# Patient Record
Sex: FEMALE | Race: WHITE | Employment: OTHER | URBAN - METROPOLITAN AREA
[De-identification: names, ages, dates, MRNs, and addresses within clinical notes are randomized per-mention and may not be internally consistent; named-entity substitution may affect disease eponyms.]

---

## 2012-03-01 LAB — COLONOSCOPY, EXTERNAL: NORMAL

## 2017-04-11 RX ORDER — SODIUM CHLORIDE 0.9 % (FLUSH) 0.9 %
5-10 SYRINGE (ML) INJECTION EVERY 8 HOURS
Status: CANCELLED | OUTPATIENT
Start: 2017-04-11 | End: 2017-04-12

## 2017-04-11 RX ORDER — FENTANYL CITRATE 50 UG/ML
100 INJECTION, SOLUTION INTRAMUSCULAR; INTRAVENOUS
Status: CANCELLED | OUTPATIENT
Start: 2017-04-11

## 2017-04-11 RX ORDER — SODIUM CHLORIDE 9 MG/ML
50 INJECTION, SOLUTION INTRAVENOUS CONTINUOUS
Status: CANCELLED | OUTPATIENT
Start: 2017-04-11 | End: 2017-04-12

## 2017-04-11 RX ORDER — ATROPINE SULFATE 0.1 MG/ML
0.5 INJECTION INTRAVENOUS
Status: CANCELLED | OUTPATIENT
Start: 2017-04-11 | End: 2017-04-12

## 2017-04-11 RX ORDER — SODIUM CHLORIDE 0.9 % (FLUSH) 0.9 %
5-10 SYRINGE (ML) INJECTION AS NEEDED
Status: CANCELLED | OUTPATIENT
Start: 2017-04-11 | End: 2017-04-12

## 2017-04-11 RX ORDER — DEXTROMETHORPHAN/PSEUDOEPHED 2.5-7.5/.8
1.2 DROPS ORAL
Status: CANCELLED | OUTPATIENT
Start: 2017-04-11

## 2017-04-11 RX ORDER — MIDAZOLAM HYDROCHLORIDE 1 MG/ML
.25-1 INJECTION, SOLUTION INTRAMUSCULAR; INTRAVENOUS
Status: CANCELLED | OUTPATIENT
Start: 2017-04-11 | End: 2017-04-12

## 2017-04-11 RX ORDER — EPINEPHRINE 0.1 MG/ML
1 INJECTION INTRACARDIAC; INTRAVENOUS
Status: CANCELLED | OUTPATIENT
Start: 2017-04-11 | End: 2017-04-12

## 2017-04-11 RX ORDER — FLUMAZENIL 0.1 MG/ML
0.2 INJECTION INTRAVENOUS
Status: CANCELLED | OUTPATIENT
Start: 2017-04-11 | End: 2017-04-12

## 2017-04-11 RX ORDER — NALOXONE HYDROCHLORIDE 0.4 MG/ML
0.4 INJECTION, SOLUTION INTRAMUSCULAR; INTRAVENOUS; SUBCUTANEOUS
Status: CANCELLED | OUTPATIENT
Start: 2017-04-11 | End: 2017-04-12

## 2017-04-12 ENCOUNTER — ANESTHESIA (OUTPATIENT)
Dept: ENDOSCOPY | Age: 82
End: 2017-04-12
Payer: MEDICARE

## 2017-04-12 ENCOUNTER — ANESTHESIA EVENT (OUTPATIENT)
Dept: ENDOSCOPY | Age: 82
End: 2017-04-12
Payer: MEDICARE

## 2017-04-12 ENCOUNTER — HOSPITAL ENCOUNTER (OUTPATIENT)
Age: 82
Setting detail: OUTPATIENT SURGERY
Discharge: HOME | End: 2017-04-12
Attending: INTERNAL MEDICINE | Admitting: INTERNAL MEDICINE
Payer: MEDICARE

## 2017-04-12 VITALS
TEMPERATURE: 98 F | OXYGEN SATURATION: 96 % | WEIGHT: 160 LBS | BODY MASS INDEX: 27.31 KG/M2 | DIASTOLIC BLOOD PRESSURE: 72 MMHG | HEART RATE: 74 BPM | RESPIRATION RATE: 14 BRPM | SYSTOLIC BLOOD PRESSURE: 120 MMHG | HEIGHT: 64 IN

## 2017-04-12 LAB
GLUCOSE BLD STRIP.AUTO-MCNC: 85 MG/DL (ref 65–100)
SERVICE CMNT-IMP: NORMAL

## 2017-04-12 PROCEDURE — 76060000031 HC ANESTHESIA FIRST 0.5 HR: Performed by: INTERNAL MEDICINE

## 2017-04-12 PROCEDURE — 82962 GLUCOSE BLOOD TEST: CPT

## 2017-04-12 PROCEDURE — 76040000019: Performed by: INTERNAL MEDICINE

## 2017-04-12 PROCEDURE — 74011250636 HC RX REV CODE- 250/636

## 2017-04-12 PROCEDURE — 74011000250 HC RX REV CODE- 250

## 2017-04-12 RX ORDER — LIDOCAINE HYDROCHLORIDE 20 MG/ML
INJECTION, SOLUTION EPIDURAL; INFILTRATION; INTRACAUDAL; PERINEURAL AS NEEDED
Status: DISCONTINUED | OUTPATIENT
Start: 2017-04-12 | End: 2017-04-12 | Stop reason: HOSPADM

## 2017-04-12 RX ORDER — PROPOFOL 10 MG/ML
INJECTION, EMULSION INTRAVENOUS AS NEEDED
Status: DISCONTINUED | OUTPATIENT
Start: 2017-04-12 | End: 2017-04-12 | Stop reason: HOSPADM

## 2017-04-12 RX ORDER — SODIUM CHLORIDE 9 MG/ML
INJECTION, SOLUTION INTRAVENOUS
Status: DISCONTINUED | OUTPATIENT
Start: 2017-04-12 | End: 2017-04-12 | Stop reason: HOSPADM

## 2017-04-12 RX ADMIN — PROPOFOL 50 MG: 10 INJECTION, EMULSION INTRAVENOUS at 15:28

## 2017-04-12 RX ADMIN — PROPOFOL 50 MG: 10 INJECTION, EMULSION INTRAVENOUS at 15:30

## 2017-04-12 RX ADMIN — SODIUM CHLORIDE: 9 INJECTION, SOLUTION INTRAVENOUS at 15:16

## 2017-04-12 RX ADMIN — PROPOFOL 100 MG: 10 INJECTION, EMULSION INTRAVENOUS at 15:26

## 2017-04-12 RX ADMIN — LIDOCAINE HYDROCHLORIDE 100 MG: 20 INJECTION, SOLUTION EPIDURAL; INFILTRATION; INTRACAUDAL; PERINEURAL at 15:26

## 2017-04-12 NOTE — IP AVS SNAPSHOT
[address] 
[phone] Patient: Shruti Chavez MRN: [id number] MCP:3/32/6491 You are allergic to the following Allergen Reactions Ketotifen Other (comments) Redness of eye, irritation. Lisinopril Cough Wheat Other (comments) Recent Documentation Height Weight Breastfeeding? BMI OB Status Smoking Status 1.626 m 72.6 kg No 27.46 kg/m2 Postmenopausal Never Smoker Emergency Contacts Name Discharge Info Relation Home Work Mobile [address] CAREGIVER [3] Spouse [3] [phone] About your hospitalization You were admitted on:  April 12, 2017 You last received care in the:  [facility] ENDOSCOPY You were discharged on:  April 12, 2017 Unit phone number:  [phone] Why you were hospitalized Your primary diagnosis was:  Not on File Providers Seen During Your Hospitalizations Provider Role Specialty Primary office phone Tati Loya MD Attending Provider Gastroenterology [phone] Your Primary Care Physician (PCP) Primary Care Physician Office Phone Office Fax TRISTAN SEGUNDO ** None ** ** None ** Follow-up Information None Current Discharge Medication List  
  
CONTINUE these medications which have NOT CHANGED Dose & Instructions Dispensing Information Comments Morning Noon Evening Bedtime  
 amLODIPine 5 mg tablet Commonly known as:  Benjimajustin Floro Your last dose was: Your next dose is:    
   
   
 Dose:  5 mg Take 5 mg by mouth daily. Refills:  0  
     
   
   
   
  
 aspirin 81 mg tablet Your last dose was: Your next dose is:    
   
   
 Dose:  81 mg Take 81 mg by mouth daily. Refills:  0  
     
   
   
   
  
 atorvastatin 40 mg tablet Commonly known as:  LIPITOR Your last dose was: Your next dose is:    
   
   
 Dose:  40 mg Take 1 tablet by mouth daily. Quantity:  90 tablet Refills:  3 CALCIUM 600 + D 600-125 mg-unit Tab Generic drug:  calcium-cholecalciferol (d3) Your last dose was: Your next dose is:    
   
   
 Dose:  1 Tab Take 1 Tab by mouth two (2) times a day. Refills:  0 CENTRUM SILVER Tab tablet Generic drug:  multivitamins-minerals-lutein Your last dose was: Your next dose is:    
   
   
 Dose:  1 Tab Take 1 Tab by mouth daily. Refills:  0  
     
   
   
   
  
 chlorthalidone 25 mg tablet Commonly known as:  Aleta Rise Your last dose was: Your next dose is:    
   
   
 Dose:  25 mg Take 25 mg by mouth daily. Refills:  0  
     
   
   
   
  
 diclofenac EC 75 mg EC tablet Commonly known as:  VOLTAREN Your last dose was: Your next dose is: Take  by mouth two (2) times a day. Refills:  0  
     
   
   
   
  
 fluticasone 50 mcg/actuation nasal spray Commonly known as:  Suze Six Your last dose was: Your next dose is:    
   
   
 nightly. Refills:  0 GLUCOSAMINE-CHONDROITIN PO Your last dose was: Your next dose is: Take  by mouth two (2) times a day. Refills:  0 NAPROXEN PO Your last dose was: Your next dose is: Take  by mouth. Refills:  0 NEILMED PEDIATRIC SINUS RINSE NA Your last dose was: Your next dose is:    
   
   
 by Nasal route. Refills:  0  
     
   
   
   
  
 omeprazole 40 mg capsule Commonly known as:  PRILOSEC Your last dose was: Your next dose is:    
   
   
 Dose:  40 mg Take 40 mg by mouth daily. Refills:  0 PROVENTIL HFA 90 mcg/actuation inhaler Generic drug:  albuterol Your last dose was: Your next dose is: Take  by inhalation. Refills:  0 SINGULAIR 10 mg tablet Generic drug:  montelukast  
   
Your last dose was: Your next dose is:    
   
   
 Dose:  10 mg Take 10 mg by mouth daily. Refills:  0 Discharge Instructions Bright Stevens. 
217 [facility] [address] [place], [address] 
[phone] DISCHARGE INSTRUCTIONS Lily Mehta [id number] 
1934 DISCOMFORT: 
Sore throat-  warm salt water gargle 
redness at IV site- apply warm compress to area; if redness or soreness persist- contact your physician Gaseous discomfort- walking, belching will help relieve any discomfort You may not operate a vehicle for 12 hours You may not engage in an occupation involving machinery or appliances for rest of today You may not drink alcoholic beverages for at least 12 hours Avoid making any critical decisions for at least 24 hour DIET You may eat and drink after you leave. You may resume your regular diet  however -  remember your colon is empty and a heavy meal will produce gas. Avoid these foods:  vegetables, fried / greasy foods, carbonated drinks ACTIVITY You may resume your normal daily activities Spend the remainder of the day resting -  avoid any strenuous activity. CALL M.D. ANY SIGN OF Increasing pain, nausea, vomiting Abdominal distension (swelling) New increased bleeding (oral or rectal) Fever (chills) Bloody discharge from nose or mouth Shortness of breath or chest pain call 911 then call Dr Luis E Vasquez Follow-up Instructions: 
 Call Dr. Luis E Vasquez for any questions or problems. If we took a biopsy please call the office within 2 weeks to discuss your                             pathology results. Telephone # [phone] Continue same medications. Discharge Orders None Introducing [facility] SERVICES! [place] [employer] introduces [employer] patient portal. Now you can access parts of your medical record, email your doctor's office, and request medication refills online. 1. In your internet browser, go to [email or web address] 2. Click on the First Time User? Click Here link in the Sign In box. You will see the New Member Sign Up page. 3. Enter your [employer] Access Code exactly as it appears below. You will not need to use this code after youve completed the sign-up process. If you do not sign up before the expiration date, you must request a new code. · [employer] Access Code: [email or web address] Expires: 7/11/2017  2:16 PM 
 
4. Enter the last four digits of your Social Security Number (xxxx) and Date of Birth (mm/dd/yyyy) as indicated and click Submit. You will be taken to the next sign-up page. 5. Create a [employer] ID. This will be your [employer] login ID and cannot be changed, so think of one that is secure and easy to remember. 6. Create a [employer] password. You can change your password at any time. 7. Enter your Password Reset Question and Answer. This can be used at a later time if you forget your password. 8. Enter your e-mail address. You will receive e-mail notification when new information is available in [address] 9. Click Sign Up. You can now view and download portions of your medical record. 10. Click the Download Summary menu link to download a portable copy of your medical information. If you have questions, please visit the Frequently Asked Questions section of the [employer] website. Remember, [employer] is NOT to be used for urgent needs. For medical emergencies, dial 911. Now available from your iPhone and Android! General Information Please provide this summary of care documentation to your next provider. Patient Signature:  ____________________________________________________________ Date:  ____________________________________________________________  
  
Lolita Cons Provider Signature:  ____________________________________________________________ Date:  ____________________________________________________________

## 2017-04-12 NOTE — ROUTINE PROCESS
Jordi Dale  1934  [id number]    Situation:  Verbal report received from: Paulino Adam RN  Procedure: Procedure(s):  ESOPHAGOGASTRODUODENOSCOPY (EGD)  ESOPHAGEAL DILATION    Background:    Preoperative diagnosis: DYSPHAGIA  Postoperative diagnosis: 1. esophageal dismotility  2. hiatal hernia  3.  Schatzsk'si ring    :  Dr. Alan Rosen  Assistant(s): Endoscopy Technician-1: [facility]  Endoscopy RN-1: Daysi Collins RN    Specimens: * No specimens in log *  H. Pylori  no    Assessment:  Intra-procedure medications         Anesthesia gave intra-procedure sedation and medications, see anesthesia flow sheet yes    Intravenous fluids: NS@ KVO     Vital signs stable     Abdominal assessment: round and soft   Recommendation:  Discharge patient per MD order  Family or Friend   Permission to share finding with family or friend yes

## 2017-04-12 NOTE — ANESTHESIA PREPROCEDURE EVALUATION
Anesthetic History   No history of anesthetic complications            Review of Systems / Medical History  Patient summary reviewed, nursing notes reviewed and pertinent labs reviewed    Pulmonary            Asthma        Neuro/Psych   Within defined limits           Cardiovascular    Hypertension              Exercise tolerance: >4 METS     GI/Hepatic/Renal     GERD          Comments: Dysphagia  Endo/Other        Obesity and arthritis     Other Findings            Physical Exam    Airway  Mallampati: I  TM Distance: > 6 cm  Neck ROM: normal range of motion        Cardiovascular    Rhythm: regular  Rate: normal         Dental    Dentition: Edentulous     Pulmonary  Breath sounds clear to auscultation               Abdominal  Abdominal exam normal       Other Findings            Anesthetic Plan    ASA: 3  Anesthesia type: MAC          Induction: Intravenous  Anesthetic plan and risks discussed with: Patient

## 2017-04-12 NOTE — PROCEDURES
118 [facility]3].  217 [facility] [address] [place]  [place], [address]  [phone]                            NAME:  Shaheed Lora   :   1934   MRN:   [id number]     Date/Time:  2017 3:36 PM    Esophagogastroduodenoscopy (EGD) Procedure Note    :  Maria M Varghese MD    Referring Provider:  Taniya Avina MD    Anethesia/Sedation:  MAC anesthesia    Procedure Details     After infom consent was obtained for the procedure, with all risks and benefits of procedure explained the patient was taken to the endoscopy suite and placed in the left lateral decubitus position. Following sequential administration of sedation as per above, the FSIG609 gastroscope was inserted into the mouth and advanced under direct vision to second portion of the duodenum. A careful inspection was made as the gastroscope was withdrawn, including a retroflexed view of the proximal stomach; findings and interventions are described below. Findings:  Esophagus: Upper esophageal sphincter was spastic. There was a small hiatal hernia. Stomach:normal   Duodenum/jejunum:normal        Therapies: esophageal dilation with savary sizes 54 Fr was successful. Specimens: none           EBL: None    Complications:   None; patient tolerated the procedure well. Impression:    See Postoperative diagnosis above    Recommendations:  -Follow symptoms. , -Soft and gel consistence diet, -Follow up with me prn., -No NSAIDS    Discharge disposition:  Home in the company of  when able to ambulate    Maria M Varghese MD

## 2017-04-12 NOTE — ANESTHESIA POSTPROCEDURE EVALUATION
Post-Anesthesia Evaluation and Assessment    Patient: Vicky Gonzales MRN: [id number]  SSN: [id number]    YOB: 1934  Age: 80 y.o. Sex: female       Cardiovascular Function/Vital Signs  Visit Vitals    /72    Pulse 74    Temp 36.7 °C (98 °F)    Resp 14    Ht 5' 4\" (1.626 m)    Wt 72.6 kg (160 lb)    SpO2 96%    Breastfeeding No    BMI 27.46 kg/m2       Patient is status post MAC anesthesia for Procedure(s):  ESOPHAGOGASTRODUODENOSCOPY (EGD)  ESOPHAGEAL DILATION. Nausea/Vomiting: None    Postoperative hydration reviewed and adequate. Pain:  Pain Scale 1: Numeric (0 - 10) (04/12/17 1605)  Pain Intensity 1: 0 (04/12/17 1605)   Managed    Neurological Status: At baseline    Mental Status and Level of Consciousness: Arousable    Pulmonary Status:   O2 Device: Room air (04/12/17 1605)   Adequate oxygenation and airway patent    Complications related to anesthesia: None    Post-anesthesia assessment completed.  No concerns    Signed By: Donna Johnston MD     April 12, 2017

## 2017-04-12 NOTE — DISCHARGE INSTRUCTIONS
[address] [place] [address] [address]  [address]  [id number]  1934    DISCOMFORT:  Sore throat-  warm salt water gargle  redness at IV site- apply warm compress to area; if redness or soreness persist- contact your physician  Gaseous discomfort- walking, belching will help relieve any discomfort  You may not operate a vehicle for 12 hours  You may not engage in an occupation involving machinery or appliances for rest of today  You may not drink alcoholic beverages for at least 12 hours  Avoid making any critical decisions for at least 24 hour  DIET  You may eat and drink after you leave. You may resume your regular diet - however -  remember your colon is empty and a heavy meal will produce gas. Avoid these foods:  vegetables, fried / greasy foods, carbonated drinks    ACTIVITY  You may resume your normal daily activities   Spend the remainder of the day resting -  avoid any strenuous activity. CALL M.D. ANY SIGN OF   Increasing pain, nausea, vomiting  Abdominal distension (swelling)  New increased bleeding (oral or rectal)  Fever (chills)  Bloody discharge from nose or mouth  Shortness of breath or chest pain call 911 then call Dr Madyson Banks    Follow-up Instructions:   Call Dr. Madyson Banks for any questions or problems. If we took a biopsy please call the office within 2 weeks to discuss your                             pathology results. Telephone # [phone]        Continue same medications.

## 2017-05-01 NOTE — H&P
[facility]  217 [facility] 140 [place]  1400 [facility], [address]  [phone]                                History and Physical     NAME: Lily Mehta   :  1934   MRN:  [id number]     HPI:  The patient was seen and examined. Past Surgical History:   Procedure Laterality Date    HX GYN      D & C    HX HEENT      cataract implant both eyes    HX HEENT      all teeth pulled    HX ORTHOPAEDIC      bunionectomy right foot    HX ORTHOPAEDIC  2012    bunionectomy left foot    HX OTHER SURGICAL      squamous cell skin cancer removed x 2     Past Medical History:   Diagnosis Date    Arthritis     Asthma     Cancer ([facility] [address]     squamous cell skin cancer    Chest pain, unspecified 2010    Coagulation defects     bleeding as child due to lack of vitamin K - no problems now    GERD (gastroesophageal reflux disease)     HTN (hypertension), benign 2010    Mixed hyperlipidemia 2010    Nonspecific abnormal unspecified cardiovascular function study 2010    Obesity (BMI 30-39. 9)     Other ill-defined conditions     low blood sugar    Other ill-defined conditions     some hearing loss - does not wear hearing aids    SOB (shortness of breath) 2010     Social History   Substance Use Topics    Smoking status: Never Smoker    Smokeless tobacco: Never Used    Alcohol use 0.5 oz/week     1 Glasses of wine per week      Comment: glass of wine every 1-2 weeks     Allergies   Allergen Reactions    Ketotifen Other (comments)     Redness of eye, irritation.  Lisinopril Cough    Wheat Other (comments)     Family History   Problem Relation Age of Onset   [facility] Parkinsonism Sister     Parkinsonism Sister     Cancer Mother      lymphoma     No current facility-administered medications for this encounter. Current Outpatient Prescriptions   Medication Sig    atorvastatin (LIPITOR) 40 mg tablet Take 1 tablet by mouth daily.     calcium-cholecalciferol, d3, (CALCIUM 600 + D) 600-125 mg-unit tab Take 1 Tab by mouth two (2) times a day.  montelukast (SINGULAIR) 10 mg tablet Take 10 mg by mouth daily.  SOD CHLOR&BICARB/SQUEEZ BOTTLE (NEILMED PEDIATRIC SINUS RINSE NA) by Nasal route.  fluticasone (FLONASE) 50 mcg/actuation nasal spray nightly.  albuterol (PROVENTIL HFA) 90 mcg/actuation inhaler Take  by inhalation.  NAPROXEN PO Take  by mouth.  omeprazole (PRILOSEC) 40 mg capsule Take 40 mg by mouth daily.  chlorthalidone (HYGROTEN) 25 mg tablet Take 25 mg by mouth daily.  amlodipine (NORVASC) 5 mg tablet Take 5 mg by mouth daily.  aspirin 81 mg tablet Take 81 mg by mouth daily.  multivitamins-minerals-lutein (CENTRUM SILVER) Tab Take 1 Tab by mouth daily.  GLUCOSAMINE HCL/CHONDRO GARCIA A (GLUCOSAMINE-CHONDROITIN PO) Take  by mouth two (2) times a day.  diclofenac EC (VOLTAREN) 75 mg EC tablet Take  by mouth two (2) times a day. PHYSICAL EXAM:  General: WD, WN. Alert, cooperative, no acute distress    HEENT: NC, Atraumatic. PERRLA, EOMI. Anicteric sclerae. Lungs:  CTA Bilaterally. No Wheezing/Rhonchi/Rales. Heart:  Regular  rhythm,  No murmur, No Rubs, No Gallops  Abdomen: Soft, Non distended, Non tender.  +Bowel sounds, no HSM  Extremities: No c/c/e  Neurologic:  CN 2-12 gi, Alert and oriented X 3. No acute neurological distress   Psych:   Good insight. Not anxious nor agitated. The heart, lungs and mental status were satisfactory for the administration of MAC sedation and for the procedure.       Mallampati score: 3       Assessment:   · Dysphagia    Plan:   · Endoscopic procedure  · MAC sedation   ·

## 2017-07-07 ENCOUNTER — OFFICE VISIT (OUTPATIENT)
Dept: CARDIOLOGY CLINIC | Age: 82
End: 2017-07-07

## 2017-07-07 VITALS
BODY MASS INDEX: 28.27 KG/M2 | DIASTOLIC BLOOD PRESSURE: 70 MMHG | WEIGHT: 165.6 LBS | SYSTOLIC BLOOD PRESSURE: 130 MMHG | HEIGHT: 64 IN | HEART RATE: 80 BPM

## 2017-07-07 DIAGNOSIS — E78.2 MIXED HYPERLIPIDEMIA: ICD-10-CM

## 2017-07-07 DIAGNOSIS — I10 HTN (HYPERTENSION), BENIGN: Primary | ICD-10-CM

## 2017-07-07 DIAGNOSIS — R94.30: ICD-10-CM

## 2017-07-07 NOTE — PROGRESS NOTES
HISTORY OF PRESENT ILLNESS  Antony Loo is a 80 y.o. female     SUMMARY:   Problem List  Date Reviewed: 7/7/2017          Codes Class Noted    Nonspecific abnormal unspecified cardiovascular function study ICD-10-CM: R94.30  ICD-9-CM: 794.30  12/20/2010    Overview Signed 1/5/2011 11:33 AM by Lora Jorgensen MD     Ms. Olu Jon was evaluated for atypical chest pain and an abnormal stress test in April 2008. She underwent cardiac catheterization, which showed good LV function and minimal coronary disease             Mixed hyperlipidemia ICD-10-CM: E78.2  ICD-9-CM: 272.2  12/20/2010        HTN (hypertension), benign ICD-10-CM: I10  ICD-9-CM: 401.1  12/20/2010        Chest pain, unspecified ICD-10-CM: R07.9  ICD-9-CM: 786.50  12/20/2010        SOB (shortness of breath) ICD-10-CM: R06.02  ICD-9-CM: 786.05  12/20/2010              Current Outpatient Prescriptions on File Prior to Visit   Medication Sig    atorvastatin (LIPITOR) 40 mg tablet Take 1 tablet by mouth daily.  calcium-cholecalciferol, d3, (CALCIUM 600 + D) 600-125 mg-unit tab Take 1 Tab by mouth two (2) times a day.  montelukast (SINGULAIR) 10 mg tablet Take 10 mg by mouth daily.  SOD CHLOR&BICARB/SQUEEZ BOTTLE (NEILMED PEDIATRIC SINUS RINSE NA) by Nasal route.  fluticasone (FLONASE) 50 mcg/actuation nasal spray nightly.  albuterol (PROVENTIL HFA) 90 mcg/actuation inhaler Take  by inhalation.  NAPROXEN PO Take  by mouth.  diclofenac EC (VOLTAREN) 75 mg EC tablet Take  by mouth two (2) times a day.  omeprazole (PRILOSEC) 40 mg capsule Take 40 mg by mouth daily.  chlorthalidone (HYGROTEN) 25 mg tablet Take 25 mg by mouth daily.  amlodipine (NORVASC) 5 mg tablet Take 5 mg by mouth daily.  aspirin 81 mg tablet Take 81 mg by mouth daily.  multivitamins-minerals-lutein (CENTRUM SILVER) Tab Take 1 Tab by mouth daily.     GLUCOSAMINE HCL/CHONDRO GARCIA A (GLUCOSAMINE-CHONDROITIN PO) Take  by mouth two (2) times a day.     No current facility-administered medications on file prior to visit. CARDIOLOGY STUDIES TO DATE:  3/27/08. The type of test was a treadmill - Standard Rufino Protocol with echocardiographic imaging. Exercise tolerance was fair. Cardiac stress testing was negative for chest pain and myocardial ischemia. Stress echocardiogram images showed normal resting LV wall motion and ischemia involving the inferior wall  Subsequent cath showed minimal cad       Chief Complaint   Patient presents with    Hypertension     HPI :  Ms. Graceann Jeans is doing pretty well. She has had to have some more esophageal dilatation since we last met, and she still has problems with asthma and exertional shortness of breath, but in spite of this she is trying to stay active and get a little exercise. No problems with her medications. CARDIAC ROS:   negative for chest pain, palpitations, syncope, orthopnea, paroxysmal nocturnal dyspnea, exertional chest pressure/discomfort, claudication, lower extremity edema    Family History   Problem Relation Age of Onset   [facility] Parkinsonism Sister    [facility] Parkinsonism Sister     Cancer Mother      lymphoma       Past Medical History:   Diagnosis Date    Arthritis     Asthma     Cancer ([facility] [address]     squamous cell skin cancer    Chest pain, unspecified 12/20/2010    Coagulation defects     bleeding as child due to lack of vitamin K - no problems now    GERD (gastroesophageal reflux disease)     HTN (hypertension), benign 12/20/2010    Mixed hyperlipidemia 12/20/2010    Nonspecific abnormal unspecified cardiovascular function study 12/20/2010    Obesity (BMI 30-39. 9)     Other ill-defined conditions     low blood sugar    Other ill-defined conditions     some hearing loss - does not wear hearing aids    SOB (shortness of breath) 12/20/2010       GENERAL ROS:  A comprehensive review of systems was negative except for that written in the HPI.     Visit Vitals    /70    Pulse 80    Ht 5' 4\" (1.626 m)    Wt 165 lb 9.6 oz (75.1 kg)    BMI 28.43 kg/m2       Wt Readings from Last 3 Encounters:   07/07/17 165 lb 9.6 oz (75.1 kg)   04/12/17 160 lb (72.6 kg)   07/01/16 167 lb (75.8 kg)            BP Readings from Last 3 Encounters:   07/07/17 130/70   04/12/17 120/72   07/01/16 126/72       PHYSICAL EXAM  General appearance: alert, cooperative, no distress, appears stated age  Neck: supple, symmetrical, trachea midline, no adenopathy, no carotid bruit and no JVD  Lungs: clear to auscultation bilaterally  Heart: regular rate and rhythm, S1, S2 normal, no murmur, click, rub or gallop  Extremities: extremities normal, atraumatic, no cyanosis or edema    Lab Results   Component Value Date/Time    Cholesterol, total 136 01/19/2015 12:00 AM    Cholesterol, total 175 01/20/2014 09:08 AM    Cholesterol, total 141 12/28/2012 09:34 AM    HDL Cholesterol 29 01/19/2015 12:00 AM    HDL Cholesterol 41 01/20/2014 09:08 AM    HDL Cholesterol 28 12/28/2012 09:34 AM    LDL, calculated 84 01/19/2015 12:00 AM    LDL, calculated 117 01/20/2014 09:08 AM    LDL, calculated 83 12/28/2012 09:34 AM    Triglyceride 114 01/19/2015 12:00 AM    Triglyceride 87 01/20/2014 09:08 AM    Triglyceride 150 12/28/2012 09:34 AM     ASSESSMENT  Ms. Jose Brooks is stable and asymptomatic from a cardiac standpoint on a reasonable medical regimen and needs no cardiac testing at this time. current treatment plan is effective, no change in therapy  lab results and schedule of future lab studies reviewed with patient  reviewed diet, exercise and weight control    Encounter Diagnoses   Name Primary?  HTN (hypertension), benign Yes    Mixed hyperlipidemia     Nonspecific abnormal unspecified cardiovascular function study      Orders Placed This Encounter    mometasone-formoterol (Elex Shilpa) 200-5 mcg/actuation HFA inhaler       Follow-up Disposition:  Return in about 1 year (around 7/7/2018).     Frieda Ferrell MD  7/7/2017

## 2017-07-07 NOTE — MR AVS SNAPSHOT
Visit Information Date & Time Provider Department Dept. Phone Encounter #  
 7/7/2017  1:00 PM Hakeem Blue MD [facility] Katelin Romeo [phone] [id number] Follow-up Instructions Return in about 1 year (around 7/7/2018). Upcoming Health Maintenance Date Due DTaP/Tdap/Td series (1 - Tdap) 4/26/1955 ZOSTER VACCINE AGE 60> 4/26/1994 GLAUCOMA SCREENING Q2Y 4/26/1999 OSTEOPOROSIS SCREENING (DEXA) 4/26/1999 Pneumococcal 65+ Low/Medium Risk (1 of 2 - PCV13) 4/26/1999 MEDICARE YEARLY EXAM 4/26/1999 INFLUENZA AGE 9 TO ADULT 8/1/2017 Allergies as of 7/7/2017  Review Complete On: 7/7/2017 By: Hakeem Blue MD  
  
 Severity Noted Reaction Type Reactions Ketotifen  07/01/2016    Other (comments) Redness of eye, irritation. Lisinopril  01/05/2011   Side Effect Cough Wheat  10/24/2013    Other (comments) Current Immunizations  Never Reviewed No immunizations on file. Not reviewed this visit You Were Diagnosed With   
  
 Codes Comments HTN (hypertension), benign    -  Primary ICD-10-CM: I10 
ICD-9-CM: 401.1 Mixed hyperlipidemia     ICD-10-CM: E78.2 ICD-9-CM: 272.2 Nonspecific abnormal unspecified cardiovascular function study     ICD-10-CM: R94.30 ICD-9-CM: 794.30 Vitals BP Pulse Height(growth percentile) Weight(growth percentile) BMI OB Status 130/70 80 5' 4\" (1.626 m) 165 lb 9.6 oz (75.1 kg) 28.43 kg/m2 Postmenopausal  
 Smoking Status Never Smoker Vitals History BMI and BSA Data Body Mass Index Body Surface Area  
 28.43 kg/m 2 1.84 m 2 Preferred Pharmacy Pharmacy Name Phone [facility] [phone] Your Updated Medication List  
  
   
This list is accurate as of: 7/7/17  1:36 PM.  Always use your most recent med list. amLODIPine 5 mg tablet Commonly known as:  Dwllr Take 5 mg by mouth daily. aspirin 81 mg tablet Take 81 mg by mouth daily. atorvastatin 40 mg tablet Commonly known as:  LIPITOR Take 1 tablet by mouth daily. CALCIUM 600 + D 600-125 mg-unit Tab Generic drug:  calcium-cholecalciferol (d3) Take 1 Tab by mouth two (2) times a day. CENTRUM SILVER Tab tablet Generic drug:  multivitamins-minerals-lutein Take 1 Tab by mouth daily. chlorthalidone 25 mg tablet Commonly known as:  Pennye Durie Take 25 mg by mouth daily. diclofenac EC 75 mg EC tablet Commonly known as:  VOLTAREN Take  by mouth two (2) times a day. DULERA 200-5 mcg/actuation HFA inhaler Generic drug:  mometasone-formoterol Take 2 Puffs by inhalation two (2) times a day. fluticasone 50 mcg/actuation nasal spray Commonly known as:  FLONASE  
nightly. GLUCOSAMINE-CHONDROITIN PO Take  by mouth two (2) times a day. NAPROXEN PO Take  by mouth. NEILMED PEDIATRIC SINUS RINSE NA  
by Nasal route. omeprazole 40 mg capsule Commonly known as:  PRILOSEC Take 40 mg by mouth daily. PROVENTIL HFA 90 mcg/actuation inhaler Generic drug:  albuterol Take  by inhalation. SINGULAIR 10 mg tablet Generic drug:  montelukast  
Take 10 mg by mouth daily. Follow-up Instructions Return in about 1 year (around 7/7/2018). Introducing \[facility] [place] [facility]\"" & HEALTH SERVICES! Daisy Siddiqui introduces [employer] patient portal. Now you can access parts of your medical record, email your doctor's office, and request medication refills online. 1. In your internet browser, go to [email or web address] 2. Click on the First Time User? Click Here link in the Sign In box. You will see the New Member Sign Up page. 3. Enter your [employer] Access Code exactly as it appears below. You will not need to use this code after youve completed the sign-up process.  If you do not sign up before the expiration date, you must request a new code. · [employer] Access Code: [email or web address] Expires: 7/11/2017  2:16 PM 
 
4. Enter the last four digits of your Social Security Number (xxxx) and Date of Birth (mm/dd/yyyy) as indicated and click Submit. You will be taken to the next sign-up page. 5. Create a [employer] ID. This will be your [employer] login ID and cannot be changed, so think of one that is secure and easy to remember. 6. Create a [employer] password. You can change your password at any time. 7. Enter your Password Reset Question and Answer. This can be used at a later time if you forget your password. 8. Enter your e-mail address. You will receive e-mail notification when new information is available in [address]. 9. Click Sign Up. You can now view and download portions of your medical record. 10. Click the Download Summary menu link to download a portable copy of your medical information. If you have questions, please visit the Frequently Asked Questions section of the [employer] website. Remember, [employer] is NOT to be used for urgent needs. For medical emergencies, dial 911. Now available from your iPhone and Android! Please provide this summary of care documentation to your next provider. Your primary care clinician is listed as [address]. If you have any questions after today's visit, please call [phone].

## 2018-07-31 ENCOUNTER — OFFICE VISIT (OUTPATIENT)
Dept: CARDIOLOGY CLINIC | Age: 83
End: 2018-07-31

## 2018-07-31 VITALS
HEIGHT: 64 IN | SYSTOLIC BLOOD PRESSURE: 130 MMHG | WEIGHT: 156 LBS | BODY MASS INDEX: 26.63 KG/M2 | DIASTOLIC BLOOD PRESSURE: 62 MMHG | HEART RATE: 78 BPM | RESPIRATION RATE: 16 BRPM | OXYGEN SATURATION: 98 %

## 2018-07-31 DIAGNOSIS — I10 HTN (HYPERTENSION), BENIGN: Primary | ICD-10-CM

## 2018-07-31 DIAGNOSIS — R06.02 SOB (SHORTNESS OF BREATH): ICD-10-CM

## 2018-07-31 DIAGNOSIS — E78.2 MIXED HYPERLIPIDEMIA: ICD-10-CM

## 2018-07-31 NOTE — MR AVS SNAPSHOT
[address] [facility] [address] [facility] 
[phone] Patient: Eleanor Serna MRN: [id number] BAY:[birth year] Visit Information Date & Time Provider Department Dept. Phone Encounter #  
 7/31/2018  3:00 PM Gaurav Tarn MD [facility] [place] [phone] [id number] Follow-up Instructions Return in about 1 year (around 7/31/2019). Upcoming Health Maintenance Date Due DTaP/Tdap/Td series (1 - Tdap) 4/26/1955 ZOSTER VACCINE AGE 60> 2/26/1994 GLAUCOMA SCREENING Q2Y 4/26/1999 Bone Densitometry (Dexa) Screening 4/26/1999 Pneumococcal 65+ Low/Medium Risk (1 of 2 - PCV13) 4/26/1999 MEDICARE YEARLY EXAM 3/14/2018 Influenza Age 5 to Adult 8/1/2018 Allergies as of 7/31/2018  Review Complete On: 7/31/2018 By: Gaurav Tran MD  
  
 Severity Noted Reaction Type Reactions Ketotifen  07/01/2016    Other (comments) Redness of eye, irritation. Lisinopril  01/05/2011   Side Effect Cough Wheat  10/24/2013    Other (comments) Current Immunizations  Never Reviewed No immunizations on file. Not reviewed this visit You Were Diagnosed With   
  
 Codes Comments HTN (hypertension), benign    -  Primary ICD-10-CM: I10 
ICD-9-CM: 401.1 Mixed hyperlipidemia     ICD-10-CM: E78.2 ICD-9-CM: 272.2 SOB (shortness of breath)     ICD-10-CM: R06.02 
ICD-9-CM: 786.05 Vitals BP Pulse Resp Height(growth percentile) Weight(growth percentile) SpO2  
 130/62 (BP 1 Location: Left arm, BP Patient Position: Sitting) 78 16 5' 4\" (1.626 m) 156 lb (70.8 kg) 98% BMI OB Status Smoking Status 26.78 kg/m2 Postmenopausal Never Smoker BMI and BSA Data Body Mass Index Body Surface Area  
 26.78 kg/m 2 1.79 m 2 Preferred Pharmacy Pharmacy Name Phone  [address] [phone] Your Updated Medication List  
  
   
This list is accurate as of 7/31/18  3:38 PM.  Always use your most recent med list. amLODIPine 5 mg tablet Commonly known as:  Wandra Arianne Take 5 mg by mouth daily. aspirin 81 mg tablet Take 81 mg by mouth daily. atorvastatin 40 mg tablet Commonly known as:  LIPITOR Take 1 tablet by mouth daily. CALCIUM 600 + D 600-125 mg-unit Tab Generic drug:  calcium-cholecalciferol (d3) Take 1 Tab by mouth two (2) times a day. CENTRUM SILVER Tab tablet Generic drug:  multivitamins-minerals-lutein Take 1 Tab by mouth daily. chlorthalidone 25 mg tablet Commonly known as:  Britt Fleeting Take 25 mg by mouth daily. DULERA 200-5 mcg/actuation HFA inhaler Generic drug:  mometasone-formoterol Take 2 Puffs by inhalation two (2) times a day. fluticasone 50 mcg/actuation nasal spray Commonly known as:  FLONASE  
nightly. GLUCOSAMINE-CHONDROITIN PO Take  by mouth two (2) times a day. NAPROXEN PO Take  by mouth as needed. NEILMED PEDIATRIC SINUS RINSE NA  
by Nasal route. omeprazole 40 mg capsule Commonly known as:  PRILOSEC Take 40 mg by mouth daily. PROVENTIL HFA 90 mcg/actuation inhaler Generic drug:  albuterol Take  by inhalation. SINGULAIR 10 mg tablet Generic drug:  montelukast  
Take 10 mg by mouth daily. Follow-up Instructions Return in about 1 year (around 7/31/2019). Introducing [facility] [place] & [facility] SERVICES! Frankie Delcid introduces [employer] patient portal. Now you can access parts of your medical record, email your doctor's office, and request medication refills online. 1. In your internet browser, go to [email or web address] 2. Click on the First Time User? Click Here link in the Sign In box. You will see the New Member Sign Up page. 3. Enter your [employer] Access Code exactly as it appears below.  You will not need to use this code after youve completed the sign-up process. If you do not sign up before the expiration date, you must request a new code. · [employer] Access Code: [email or web address] Expires: 10/29/2018  3:38 PM 
 
4. Enter the last four digits of your Social Security Number (xxxx) and Date of Birth (mm/dd/yyyy) as indicated and click Submit. You will be taken to the next sign-up page. 5. Create a [employer] ID. This will be your [employer] login ID and cannot be changed, so think of one that is secure and easy to remember. 6. Create a [employer] password. You can change your password at any time. 7. Enter your Password Reset Question and Answer. This can be used at a later time if you forget your password. 8. Enter your e-mail address. You will receive e-mail notification when new information is available in [address] 9. Click Sign Up. You can now view and download portions of your medical record. 10. Click the Download Summary menu link to download a portable copy of your medical information. If you have questions, please visit the Frequently Asked Questions section of the [employer] website. Remember, [employer] is NOT to be used for urgent needs. For medical emergencies, dial 911. Now available from your iPhone and Android! Please provide this summary of care documentation to your next provider. Your primary care clinician is listed as [address] [facility]. If you have any questions after today's visit, please call [phone].

## 2019-01-17 ENCOUNTER — TELEPHONE (OUTPATIENT)
Dept: CARDIOLOGY CLINIC | Age: 84
End: 2019-01-17

## 2019-01-17 NOTE — TELEPHONE ENCOUNTER
Called patient. Verified patient's identity with two identifiers. Notified patient Dr. Maryanne Gardner said this was fine. Patient verbalized understanding and denied further questions or concerns.

## 2019-01-17 NOTE — TELEPHONE ENCOUNTER
Called patient. Left message on voicemail stating I was returning call. Patient returned call. Verified patient's identity with two identifiers. Patient said her dermatologist took her off aspirin for 6 weeks to see if it will help with rash. Patient would like to know Dr. Juan Alberto Smith thoughts about this. I told her I would message Dr. Julio Cesar Santos and call her back. Patient denied further questions or concerns.

## 2019-05-08 LAB — MAMMOGRAPHY, EXTERNAL: NORMAL

## 2019-06-24 ENCOUNTER — HOSPITAL ENCOUNTER (OUTPATIENT)
Age: 84
Setting detail: OUTPATIENT SURGERY
Discharge: HOME | End: 2019-06-24
Attending: INTERNAL MEDICINE | Admitting: INTERNAL MEDICINE
Payer: MEDICARE

## 2019-06-24 ENCOUNTER — ANESTHESIA EVENT (OUTPATIENT)
Dept: ENDOSCOPY | Age: 84
End: 2019-06-24
Payer: MEDICARE

## 2019-06-24 ENCOUNTER — ANESTHESIA (OUTPATIENT)
Dept: ENDOSCOPY | Age: 84
End: 2019-06-24
Payer: MEDICARE

## 2019-06-24 VITALS
BODY MASS INDEX: 26.78 KG/M2 | DIASTOLIC BLOOD PRESSURE: 51 MMHG | HEART RATE: 79 BPM | RESPIRATION RATE: 18 BRPM | TEMPERATURE: 98.4 F | SYSTOLIC BLOOD PRESSURE: 106 MMHG | OXYGEN SATURATION: 98 % | WEIGHT: 156 LBS

## 2019-06-24 LAB
GLUCOSE BLD STRIP.AUTO-MCNC: 90 MG/DL (ref 65–100)
SERVICE CMNT-IMP: NORMAL

## 2019-06-24 PROCEDURE — 74011250636 HC RX REV CODE- 250/636

## 2019-06-24 PROCEDURE — 88305 TISSUE EXAM BY PATHOLOGIST: CPT

## 2019-06-24 PROCEDURE — 77030020268 HC MISC GENERAL SUPPLY: Performed by: INTERNAL MEDICINE

## 2019-06-24 PROCEDURE — 76040000007: Performed by: INTERNAL MEDICINE

## 2019-06-24 PROCEDURE — 82962 GLUCOSE BLOOD TEST: CPT

## 2019-06-24 PROCEDURE — 77030021593 HC FCPS BIOP ENDOSC BSC -A: Performed by: INTERNAL MEDICINE

## 2019-06-24 PROCEDURE — 76060000032 HC ANESTHESIA 0.5 TO 1 HR: Performed by: INTERNAL MEDICINE

## 2019-06-24 PROCEDURE — 77030010936 HC CLP LIG BSC -C: Performed by: INTERNAL MEDICINE

## 2019-06-24 RX ORDER — PROPOFOL 10 MG/ML
INJECTION, EMULSION INTRAVENOUS AS NEEDED
Status: DISCONTINUED | OUTPATIENT
Start: 2019-06-24 | End: 2019-06-24 | Stop reason: HOSPADM

## 2019-06-24 RX ORDER — FLUMAZENIL 0.1 MG/ML
0.2 INJECTION INTRAVENOUS
Status: DISCONTINUED | OUTPATIENT
Start: 2019-06-24 | End: 2019-06-24 | Stop reason: HOSPADM

## 2019-06-24 RX ORDER — EPINEPHRINE 0.1 MG/ML
1 INJECTION INTRACARDIAC; INTRAVENOUS
Status: DISCONTINUED | OUTPATIENT
Start: 2019-06-24 | End: 2019-06-24 | Stop reason: HOSPADM

## 2019-06-24 RX ORDER — SODIUM CHLORIDE 9 MG/ML
INJECTION, SOLUTION INTRAVENOUS
Status: DISCONTINUED | OUTPATIENT
Start: 2019-06-24 | End: 2019-06-24 | Stop reason: HOSPADM

## 2019-06-24 RX ORDER — DEXTROMETHORPHAN/PSEUDOEPHED 2.5-7.5/.8
1.2 DROPS ORAL
Status: DISCONTINUED | OUTPATIENT
Start: 2019-06-24 | End: 2019-06-24 | Stop reason: HOSPADM

## 2019-06-24 RX ORDER — SODIUM CHLORIDE 0.9 % (FLUSH) 0.9 %
5-40 SYRINGE (ML) INJECTION AS NEEDED
Status: DISCONTINUED | OUTPATIENT
Start: 2019-06-24 | End: 2019-06-24 | Stop reason: HOSPADM

## 2019-06-24 RX ORDER — AZELASTINE HYDROCHLORIDE, FLUTICASONE PROPIONATE 137; 50 UG/1; UG/1
SPRAY, METERED NASAL
COMMUNITY
End: 2021-03-18

## 2019-06-24 RX ORDER — FLUTICASONE FUROATE AND VILANTEROL 100; 25 UG/1; UG/1
1 POWDER RESPIRATORY (INHALATION) DAILY
COMMUNITY
End: 2020-09-10

## 2019-06-24 RX ORDER — LIDOCAINE HYDROCHLORIDE 20 MG/ML
INJECTION, SOLUTION EPIDURAL; INFILTRATION; INTRACAUDAL; PERINEURAL AS NEEDED
Status: DISCONTINUED | OUTPATIENT
Start: 2019-06-24 | End: 2019-06-24 | Stop reason: HOSPADM

## 2019-06-24 RX ORDER — SODIUM CHLORIDE 0.9 % (FLUSH) 0.9 %
5-40 SYRINGE (ML) INJECTION EVERY 8 HOURS
Status: DISCONTINUED | OUTPATIENT
Start: 2019-06-24 | End: 2019-06-24 | Stop reason: HOSPADM

## 2019-06-24 RX ORDER — SODIUM CHLORIDE 9 MG/ML
50 INJECTION, SOLUTION INTRAVENOUS CONTINUOUS
Status: DISCONTINUED | OUTPATIENT
Start: 2019-06-24 | End: 2019-06-24 | Stop reason: HOSPADM

## 2019-06-24 RX ORDER — NALOXONE HYDROCHLORIDE 0.4 MG/ML
0.4 INJECTION, SOLUTION INTRAMUSCULAR; INTRAVENOUS; SUBCUTANEOUS
Status: DISCONTINUED | OUTPATIENT
Start: 2019-06-24 | End: 2019-06-24 | Stop reason: HOSPADM

## 2019-06-24 RX ORDER — ATROPINE SULFATE 0.1 MG/ML
0.5 INJECTION INTRAVENOUS
Status: DISCONTINUED | OUTPATIENT
Start: 2019-06-24 | End: 2019-06-24 | Stop reason: HOSPADM

## 2019-06-24 RX ADMIN — PROPOFOL 30 MG: 10 INJECTION, EMULSION INTRAVENOUS at 13:07

## 2019-06-24 RX ADMIN — PROPOFOL 30 MG: 10 INJECTION, EMULSION INTRAVENOUS at 13:16

## 2019-06-24 RX ADMIN — PROPOFOL 70 MG: 10 INJECTION, EMULSION INTRAVENOUS at 13:05

## 2019-06-24 RX ADMIN — SODIUM CHLORIDE: 9 INJECTION, SOLUTION INTRAVENOUS at 12:48

## 2019-06-24 RX ADMIN — LIDOCAINE HYDROCHLORIDE 50 MG: 20 INJECTION, SOLUTION EPIDURAL; INFILTRATION; INTRACAUDAL; PERINEURAL at 13:05

## 2019-06-24 RX ADMIN — PROPOFOL 30 MG: 10 INJECTION, EMULSION INTRAVENOUS at 13:11

## 2019-06-24 RX ADMIN — PROPOFOL 30 MG: 10 INJECTION, EMULSION INTRAVENOUS at 13:14

## 2019-06-24 RX ADMIN — PROPOFOL 50 MG: 10 INJECTION, EMULSION INTRAVENOUS at 13:19

## 2019-06-24 RX ADMIN — PROPOFOL 30 MG: 10 INJECTION, EMULSION INTRAVENOUS at 13:09

## 2019-06-24 NOTE — PERIOP NOTES
Patient has been evaluated by anesthesia pre-procedure. Patient alert and oriented. Vital signs will not be charted by the Endoscopy nurse. All vitals, airway, and loc are monitored by anesthesia staff throughout procedure. .Endoscope was pre-cleaned at bedside immediately following procedure by David Carrero    Resolution  Clips to duodenum :    LOT [id number]  REF: [id number]  Expiration: 03/13/2022    LOT: [id number]  REF: [id number]  Expiration: 03/02/2022    48 Maltese savory  dilatation of the esophagus   No subcutaneous crepitus of the chest or cervical region was noted post dilatation.

## 2019-06-24 NOTE — H&P
[place] 272  [address] [facility] 140 [facility], [address]  [phone]                                History and Physical     NAME: Darrian Joy   :  1934   MRN:  [id number]     HPI:  The patient was seen and examined. Past Surgical History:   Procedure Laterality Date    HX GYN      D & C    HX HEENT      cataract implant both eyes    HX HEENT      all teeth pulled    HX ORTHOPAEDIC      bunionectomy right foot    HX ORTHOPAEDIC  2012    bunionectomy left foot    HX OTHER SURGICAL      squamous cell skin cancer removed x 2     Past Medical History:   Diagnosis Date    Arthritis     Asthma     Cancer ([address]     squamous cell skin cancer    Chest pain, unspecified 2010    Coagulation defects     bleeding as child due to lack of vitamin K - no problems now    GERD (gastroesophageal reflux disease)     HTN (hypertension), benign 2010    Mixed hyperlipidemia 2010    Nonspecific abnormal unspecified cardiovascular function study 2010    Obesity (BMI 30-39. 9)     Other ill-defined conditions(799.89)     low blood sugar    Other ill-defined conditions(799.89)     some hearing loss - does not wear hearing aids    SOB (shortness of breath) 2010     Social History     Tobacco Use    Smoking status: Never Smoker    Smokeless tobacco: Never Used   Substance Use Topics    Alcohol use: Yes     Alcohol/week: 0.5 oz     Types: 1 Glasses of wine per week     Comment: glass of wine every 1-2 weeks    Drug use: No     Allergies   Allergen Reactions    Amoxicillin Rash    Ketotifen Other (comments)     Redness of eye, irritation.  Lisinopril Cough    Losartan Other (comments)    Wheat Other (comments)     Family History   Problem Relation Age of Onset   [address] Parkinsonism Sister     Parkinsonism Sister     Cancer Mother         lymphoma     No current facility-administered medications for this encounter.           PHYSICAL EXAM:  General: WD, WN. Alert, cooperative, no acute distress    HEENT: NC, Atraumatic. PERRLA, EOMI. Anicteric sclerae. Lungs:  CTA Bilaterally. No Wheezing/Rhonchi/Rales. Heart:  Regular  rhythm,  No murmur, No Rubs, No Gallops  Abdomen: Soft, Non distended, Non tender.  +Bowel sounds, no HSM  Extremities: No c/c/e  Neurologic:  CN 2-12 gi, Alert and oriented X 3. No acute neurological distress   Psych:   Good insight. Not anxious nor agitated. The heart, lungs and mental status were satisfactory for the administration of MAC sedation and for the procedure.       Mallampati score: 3       Assessment:   · Dysphagia    Plan:   · Endoscopic procedure  · MAC sedation   ·

## 2019-06-24 NOTE — PROCEDURES
[address] [facility].  [address] [facility] 140 [facility], [address]  [phone]                            NAME:  Frandy Sesay   :   1934   MRN:   [id number]     Date/Time:  2019 1:32 PM    Esophagogastroduodenoscopy (EGD) Procedure Note    :  Shruthi King MD    Surgical Assistant: None    Implants: 2 Resolution clips placed in 2nd portion duodenum    Referring Provider:  Zuleyka Strange MD    Anethesia/Sedation:  MAC anesthesia    Procedure Details     After infom consent was obtained for the procedure, with all risks and benefits of procedure explained the patient was taken to the endoscopy suite and placed in the left lateral decubitus position. Following sequential administration of sedation as per above, the [id number] gastroscope was inserted into the mouth and advanced under direct vision to second portion of the duodenum. A careful inspection was made as the gastroscope was withdrawn, including a retroflexed view of the proximal stomach; findings and interventions are described below. 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 Lot of tertiary contractions were noted. Stomach: Few small 3-5 mm benign appearing fundic gland polyps were noted in body and fundus. Duodenum/jejunum: Mild flattening of folds was noted in second portion of duodenum. Therapies:  esophageal dilation with savary sizes 48 Fr was successful  biopsy of duodenal second portion. Oozing was present after the biopsy and hence two resolution clips were placed for hemostasis successfully    Specimens: Second portion duodenum           EBL: minimal    Complications:   None; patient tolerated the procedure well. Impression:    See Postoperative diagnosis above    Recommendations:  -Await pathology. , -Follow symptoms. , -Soft diet, -No NSAID's    Discharge disposition:  Home in the company of  when able to ambulate    Shruthi King MD

## 2019-06-24 NOTE — DISCHARGE INSTRUCTIONS
[address] [facility] [address]  [address] [place] [address], [place] [address]  [address]  [id number]  1934    DISCOMFORT:  Sore throat- throat lozenges or warm salt water gargle  redness at IV site- apply warm compress to area; if redness or soreness persist- contact your physician  Gaseous discomfort- walking, belching will help relieve any discomfort  You may not operate a vehicle for 12 hours  You may not engage in an occupation involving machinery or appliances for rest of today  You may not drink alcoholic beverages for at least 12 hours  Avoid making any critical decisions for at least 24 hour  DIET  You may eat and drink after you leave. You may resume your regular diet - however -  remember your colon is empty and a heavy meal will produce gas. Avoid these foods:  vegetables, fried / greasy foods, carbonated drinks    ACTIVITY  You may resume your normal daily activities   Spend the remainder of the day resting -  avoid any strenuous activity. CALL M.D. ANY SIGN OF   Increasing pain, nausea, vomiting  Abdominal distension (swelling)  New increased bleeding (oral or rectal)  Fever (chills)  Pain in chest area  Bloody discharge from nose or mouth  Shortness of breath    Follow-up Instructions:   Call Dr. Lavon Marquez for any questions or problems. If we took a biopsy please call the office within 2 weeks to discuss your                             pathology results. Telephone # [phone]        Continue same medications.     ENDOSCOPY FINDINGS:  Cricopharyngena spasm  Esophageal dysmotility  Abnormal mucosal duodenum

## 2019-06-24 NOTE — ANESTHESIA POSTPROCEDURE EVALUATION
Procedure(s):  ESOPHAGOGASTRODUODENOSCOPY (EGD) WITH POSSIBLE DILATION  ESOPHAGOGASTRODUODENAL (EGD) BIOPSY  RESOLUTION CLIP  ESOPHAGEAL DILATION. MAC    Anesthesia Post Evaluation        Patient location during evaluation: PACU  Patient participation: complete - patient participated  Level of consciousness: awake and alert  Pain management: adequate  Airway patency: patent  Anesthetic complications: no  Cardiovascular status: acceptable  Respiratory status: acceptable  Hydration status: acceptable  Comments: I have seen and evaluated the patient and is ready for discharge. Dasha Starks MD    Post anesthesia nausea and vomiting:  none      Vitals Value Taken Time   BP     Temp     Pulse 82 6/24/2019  1:32 PM   Resp 14 6/24/2019  1:32 PM   SpO2 95 % 6/24/2019  1:32 PM   Vitals shown include unvalidated device data.

## 2019-06-24 NOTE — ROUTINE PROCESS
[place] [employer]  1934  [id number]    Situation:  Verbal report received from: Yolis Melara RN  Procedure: Procedure(s):  ESOPHAGOGASTRODUODENOSCOPY (EGD) WITH POSSIBLE DILATION  ESOPHAGOGASTRODUODENAL (EGD) BIOPSY  RESOLUTION CLIP  ESOPHAGEAL DILATION    Background:    Preoperative diagnosis: CELIAC DISEASE, DYSPHAGIA, GERD, CHRONIC COUGH  Postoperative diagnosis: Cricopharyngena spasm,     :  Dr. Min Mckay  Assistant(s): Endoscopy RN-1: Merlinda Och, RN  Endoscopy RN-2: Price Chance RN    Specimens:   ID Type Source Tests Collected by Time Destination   1 : duodenal bx, please R/O celiac disease Preservative Duodenum  Lynette Kumar MD 6/24/2019 1307 Pathology     H. Pylori  no    Assessment:  Intra-procedure medications   Anesthesia gave intra-procedure sedation and medications, see anesthesia flow sheet yes    Intravenous fluids: NS@ KVO     Vital signs stable     Abdominal assessment: round and soft     Recommendation:  Discharge patient per MD order.     Family or Friend   Permission to share finding with family or friend yes

## 2019-08-06 ENCOUNTER — OFFICE VISIT (OUTPATIENT)
Dept: CARDIOLOGY CLINIC | Age: 84
End: 2019-08-06

## 2019-08-06 VITALS
HEART RATE: 66 BPM | DIASTOLIC BLOOD PRESSURE: 70 MMHG | SYSTOLIC BLOOD PRESSURE: 120 MMHG | WEIGHT: 162.4 LBS | RESPIRATION RATE: 16 BRPM | HEIGHT: 64 IN | OXYGEN SATURATION: 95 % | BODY MASS INDEX: 27.72 KG/M2

## 2019-08-06 DIAGNOSIS — E78.2 MIXED HYPERLIPIDEMIA: ICD-10-CM

## 2019-08-06 DIAGNOSIS — I10 HTN (HYPERTENSION), BENIGN: Primary | ICD-10-CM

## 2019-08-06 DIAGNOSIS — R06.02 SOB (SHORTNESS OF BREATH): ICD-10-CM

## 2019-08-06 DIAGNOSIS — R94.30 NONSPECIFIC ABNORMAL RESULTS OF CARDIOVASCULAR FUNCTION STUDY: ICD-10-CM

## 2019-08-06 NOTE — PROGRESS NOTES
HISTORY OF PRESENT ILLNESS  Keron Cary is a 80 y.o. female     SUMMARY:   Problem List  Date Reviewed: 8/6/2019          Codes Class Noted    Nonspecific abnormal results of cardiovascular function study ICD-10-CM: R94.30  ICD-9-CM: 794.30  12/20/2010    Overview Signed 1/5/2011 11:33 AM by Jo Edwards MD     Ms. Manuel Joy was evaluated for atypical chest pain and an abnormal stress test in April 2008. She underwent cardiac catheterization, which showed good LV function and minimal coronary disease             Mixed hyperlipidemia ICD-10-CM: E78.2  ICD-9-CM: 272.2  12/20/2010        HTN (hypertension), benign ICD-10-CM: I10  ICD-9-CM: 401.1  12/20/2010        Chest pain, unspecified ICD-10-CM: R07.9  ICD-9-CM: 786.50  12/20/2010        SOB (shortness of breath) ICD-10-CM: R06.02  ICD-9-CM: 786.05  12/20/2010              Current Outpatient Medications on File Prior to Visit   Medication Sig    fluticasone furoate-vilanterol (BREO ELLIPTA) 100-25 mcg/dose inhaler Take 1 Puff by inhalation daily.  azelastine-fluticasone (DYMISTA) 137-50 mcg/spray spry by Nasal route.  atorvastatin (LIPITOR) 40 mg tablet Take 1 tablet by mouth daily.  calcium-cholecalciferol, d3, (CALCIUM 600 + D) 600-125 mg-unit tab Take 1 Tab by mouth two (2) times a day.  montelukast (SINGULAIR) 10 mg tablet Take 10 mg by mouth daily.  SOD CHLOR&BICARB/SQUEEZ BOTTLE (NEILMED PEDIATRIC SINUS RINSE NA) by Nasal route.  albuterol (PROVENTIL HFA) 90 mcg/actuation inhaler Take  by inhalation.  omeprazole (PRILOSEC) 40 mg capsule Take 40 mg by mouth daily.  chlorthalidone (HYGROTEN) 25 mg tablet Take 25 mg by mouth daily.  amlodipine (NORVASC) 5 mg tablet Take 5 mg by mouth daily.  multivitamins-minerals-lutein (CENTRUM SILVER) Tab Take 1 Tab by mouth daily.  GLUCOSAMINE HCL/CHONDRO GARCIA A (GLUCOSAMINE-CHONDROITIN PO) Take  by mouth two (2) times a day.     mometasone-formoterol (DULERA) 200-5 mcg/actuation HFA inhaler Take 2 Puffs by inhalation two (2) times a day.  fluticasone (FLONASE) 50 mcg/actuation nasal spray nightly.  NAPROXEN PO Take  by mouth as needed.  aspirin 81 mg tablet Take 81 mg by mouth daily. No current facility-administered medications on file prior to visit. CARDIOLOGY STUDIES TO DATE:  3/27/08. The type of test was a treadmill - Standard Rufino Protocol with echocardiographic imaging. Exercise tolerance was fair. Cardiac stress testing was negative for chest pain and myocardial ischemia. Stress echocardiogram images showed normal resting LV wall motion and ischemia involving the inferior wall  Subsequent cath showed minimal cad    Chief Complaint   Patient presents with    Hypertension     HPI :  Ms. Brita Bence is doing pretty well, though she has had some trouble here in this hot weather and humidity with her asthma. She is very active, working outside, weather permitting plus maintaining her own home because her  is chronically ill. Dr. Cary Antunez has been following her lipids, but he is retired, so she is looking for a new doctor. She recently had an endoscopy, which showed esophageal dysmotility and spasm.         CARDIAC ROS:   negative for chest pain, palpitations, syncope, orthopnea, paroxysmal nocturnal dyspnea, exertional chest pressure/discomfort, claudication, lower extremity edema    Family History   Problem Relation Age of Onset   [place] Parkinsonism Sister    [place] Parkinsonism Sister     Cancer Mother         lymphoma       Past Medical History:   Diagnosis Date    Arthritis     Asthma     Cancer ([facility] [address]     squamous cell skin cancer    Chest pain, unspecified 12/20/2010    Coagulation defects     bleeding as child due to lack of vitamin K - no problems now    GERD (gastroesophageal reflux disease)     HTN (hypertension), benign 12/20/2010    Mixed hyperlipidemia 12/20/2010    Nonspecific abnormal unspecified cardiovascular function study 12/20/2010    Obesity (BMI 30-39. 9)     Other ill-defined conditions(799.89)     low blood sugar    Other ill-defined conditions(799.89)     some hearing loss - does not wear hearing aids    SOB (shortness of breath) 12/20/2010       GENERAL ROS:  A comprehensive review of systems was negative except for that written in the HPI. Visit Vitals  /70 (BP 1 Location: Left arm, BP Patient Position: Sitting)   Pulse 66   Resp 16   Ht 5' 4\" (1.626 m)   Wt 162 lb 6.4 oz (73.7 kg)   SpO2 95%   BMI 27.88 kg/m²       Wt Readings from Last 3 Encounters:   08/06/19 162 lb 6.4 oz (73.7 kg)   06/24/19 156 lb (70.8 kg)   07/31/18 156 lb (70.8 kg)            BP Readings from Last 3 Encounters:   08/06/19 120/70   06/24/19 106/51   07/31/18 130/62       PHYSICAL EXAM  General appearance: alert, cooperative, no distress, appears stated age  Neurologic: Alert and oriented X 3  Neck: supple, symmetrical, trachea midline, no adenopathy, no carotid bruit and no JVD  Lungs: clear to auscultation bilaterally  Heart: regular rate and rhythm, S1, S2 normal, no murmur, click, rub or gallop  Extremities: extremities normal, atraumatic, no cyanosis or edema    Lab Results   Component Value Date/Time    Cholesterol, total 136 01/19/2015 12:00 AM    Cholesterol, total 175 01/20/2014 09:08 AM    Cholesterol, total 141 12/28/2012 09:34 AM    HDL Cholesterol 29 (L) 01/19/2015 12:00 AM    HDL Cholesterol 41 01/20/2014 09:08 AM    HDL Cholesterol 28 (L) 12/28/2012 09:34 AM    LDL, calculated 84 01/19/2015 12:00 AM    LDL, calculated 117 (H) 01/20/2014 09:08 AM    LDL, calculated 83 12/28/2012 09:34 AM    Triglyceride 114 01/19/2015 12:00 AM    Triglyceride 87 01/20/2014 09:08 AM    Triglyceride 150 (H) 12/28/2012 09:34 AM     ASSESSMENT  Ms. Leonid Brown is stable and asymptomatic and well compensated on a good medical regimen and needs no cardiac testing at this time. We will try to track down her most recent lipid profile.            current treatment plan is effective, no change in therapy  lab results and schedule of future lab studies reviewed with patient  reviewed diet, exercise and weight control    Encounter Diagnoses   Name Primary?  HTN (hypertension), benign Yes    Mixed hyperlipidemia     Nonspecific abnormal results of cardiovascular function study     SOB (shortness of breath)      No orders of the defined types were placed in this encounter. Follow-up and Dispositions    · Return in about 1 year (around 8/6/2020).          Larisa Lam MD  8/6/2019

## 2019-09-09 RX ORDER — CHLORTHALIDONE 25 MG/1
25 TABLET ORAL DAILY
Qty: 90 TAB | Refills: 3 | Status: SHIPPED | OUTPATIENT
Start: 2019-09-09 | End: 2020-08-12 | Stop reason: SDUPTHER

## 2020-01-08 DIAGNOSIS — I10 HTN (HYPERTENSION), BENIGN: Primary | ICD-10-CM

## 2020-01-08 RX ORDER — AMLODIPINE BESYLATE 5 MG/1
5 TABLET ORAL DAILY
Qty: 90 TAB | Refills: 3 | Status: SHIPPED | OUTPATIENT
Start: 2020-01-08 | End: 2021-03-23 | Stop reason: SDUPTHER

## 2020-01-08 NOTE — TELEPHONE ENCOUNTER
Requested Prescriptions     Signed Prescriptions Disp Refills    amLODIPine (NORVASC) 5 mg tablet 90 Tab 3     Sig: Take 1 Tab by mouth daily.      Authorizing Provider: Bryce Camejo     Ordering User: Rafi Moreira verbal orders

## 2020-03-11 LAB — LDL-C, EXTERNAL: 105

## 2020-06-08 LAB — AMB DEXA, EXTERNAL: NORMAL

## 2020-06-09 DIAGNOSIS — R07.9 CHEST PAIN, UNSPECIFIED: ICD-10-CM

## 2020-06-09 DIAGNOSIS — E78.2 MIXED HYPERLIPIDEMIA: ICD-10-CM

## 2020-06-09 RX ORDER — ATORVASTATIN CALCIUM 40 MG/1
40 TABLET, FILM COATED ORAL DAILY
Qty: 90 TAB | Refills: 3 | Status: SHIPPED | OUTPATIENT
Start: 2020-06-09 | End: 2021-06-04 | Stop reason: SDUPTHER

## 2020-06-09 NOTE — TELEPHONE ENCOUNTER
Requested Prescriptions     Signed Prescriptions Disp Refills    atorvastatin (LIPITOR) 40 mg tablet 90 Tab 3     Sig: Take 1 Tab by mouth daily.      Authorizing Provider: Randy Mao     Ordering User: Shari Maloney verbal orders

## 2020-07-06 VITALS
RESPIRATION RATE: 16 BRPM | DIASTOLIC BLOOD PRESSURE: 80 MMHG | SYSTOLIC BLOOD PRESSURE: 118 MMHG | TEMPERATURE: 97.7 F | WEIGHT: 160 LBS | HEIGHT: 62 IN | HEART RATE: 77 BPM | BODY MASS INDEX: 29.44 KG/M2 | OXYGEN SATURATION: 95 %

## 2020-07-06 RX ORDER — METHOCARBAMOL 750 MG/1
TABLET, FILM COATED ORAL 4 TIMES DAILY
COMMUNITY
End: 2020-08-12

## 2020-07-06 RX ORDER — FLUTICASONE FUROATE AND VILANTEROL TRIFENATATE 200; 25 UG/1; UG/1
1 POWDER RESPIRATORY (INHALATION) DAILY
COMMUNITY
End: 2021-03-18

## 2020-07-06 RX ORDER — ALENDRONATE SODIUM 70 MG/1
70 TABLET ORAL
COMMUNITY
End: 2021-06-03 | Stop reason: SDUPTHER

## 2020-07-06 RX ORDER — MELOXICAM 15 MG/1
15 TABLET ORAL DAILY
COMMUNITY
End: 2021-03-18

## 2020-08-12 ENCOUNTER — VIRTUAL VISIT (OUTPATIENT)
Dept: CARDIOLOGY CLINIC | Age: 85
End: 2020-08-12
Payer: MEDICARE

## 2020-08-12 DIAGNOSIS — R06.02 SOB (SHORTNESS OF BREATH): ICD-10-CM

## 2020-08-12 DIAGNOSIS — E78.2 MIXED HYPERLIPIDEMIA: ICD-10-CM

## 2020-08-12 DIAGNOSIS — I10 HTN (HYPERTENSION), BENIGN: Primary | ICD-10-CM

## 2020-08-12 PROCEDURE — G8427 DOCREV CUR MEDS BY ELIG CLIN: HCPCS | Performed by: SPECIALIST

## 2020-08-12 PROCEDURE — 1101F PT FALLS ASSESS-DOCD LE1/YR: CPT | Performed by: SPECIALIST

## 2020-08-12 PROCEDURE — 99213 OFFICE O/P EST LOW 20 MIN: CPT | Performed by: SPECIALIST

## 2020-08-12 PROCEDURE — 1090F PRES/ABSN URINE INCON ASSESS: CPT | Performed by: SPECIALIST

## 2020-08-12 PROCEDURE — G8432 DEP SCR NOT DOC, RNG: HCPCS | Performed by: SPECIALIST

## 2020-08-12 RX ORDER — CHLORTHALIDONE 25 MG/1
25 TABLET ORAL DAILY
Qty: 90 TAB | Refills: 3 | Status: SHIPPED | OUTPATIENT
Start: 2020-08-12 | End: 2021-08-27 | Stop reason: SDUPTHER

## 2020-08-12 NOTE — PROGRESS NOTES
[facility] Telemedicine Encounter                                                         Pursuant to the emergency declaration under the [facility] waiver authority and the [employer] Act, this Virtual  Visit was conducted, with patient's consent, to reduce the patient's risk of exposure to COVID-19 and provide continuity of care for an established patient. Services were provided through a video synchronous discussion virtually to substitute for in-person clinic visit. 3/27/08. The type of test was a treadmill - Standard Rufino Protocol with echocardiographic imaging. Exercise tolerance was fair. Cardiac stress testing was negative for chest pain and myocardial ischemia. Stress echocardiogram images showed normal resting LV wall motion and ischemia involving the inferior wall  Subsequent cath showed minimal cad     Current Outpatient Medications on File Prior to Visit   Medication Sig    alendronate (FOSAMAX) 70 mg tablet Take 70 mg by mouth every seven (7) days.  fluticasone furoate-vilanteroL (Breo Ellipta) 200-25 mcg/dose inhaler Take 1 Puff by inhalation daily.  meloxicam (MOBIC) 15 mg tablet Take 15 mg by mouth daily.  atorvastatin (LIPITOR) 40 mg tablet Take 1 Tab by mouth daily.  amLODIPine (NORVASC) 5 mg tablet Take 1 Tab by mouth daily.  chlorthalidone (HYGROTEN) 25 mg tablet Take 1 Tab by mouth daily.  fluticasone furoate-vilanterol (BREO ELLIPTA) 100-25 mcg/dose inhaler Take 1 Puff by inhalation daily.  azelastine-fluticasone (DYMISTA) 137-50 mcg/spray spry by Nasal route.  calcium-cholecalciferol, d3, (CALCIUM 600 + D) 600-125 mg-unit tab Take 1 Tab by mouth two (2) times a day.  montelukast (SINGULAIR) 10 mg tablet Take 10 mg by mouth daily.  SOD CHLOR&BICARB/SQUEEZ BOTTLE (NEILMED PEDIATRIC SINUS RINSE NA) by Nasal route.     fluticasone (FLONASE) 50 mcg/actuation nasal spray nightly.  albuterol (Proventil HFA) 90 mcg/actuation inhaler Take 2 Puffs by inhalation every six (6) hours as needed.  omeprazole (PRILOSEC) 40 mg capsule Take 40 mg by mouth daily.  multivitamins-minerals-lutein (CENTRUM SILVER) Tab Take 1 Tab by mouth daily.  GLUCOSAMINE HCL/CHONDRO GARCIA A (GLUCOSAMINE-CHONDROITIN PO) Take  by mouth two (2) times a day. No current facility-administered medications on file prior to visit. Subjective/HPI:   Laci Bangura is a 80 y.o. female who was seen by synchronous (real-time) audio-video technology on 8/12/2020. Overall she is doing fairly well. She has some trouble with her asthma during this hot humid weather but she got a new inhaler which seems to be helping. She is active but not really exercising. Weight is stable and this morning her blood pressure is 136/68. PCP Provider  Hortencia Conner NP  Past Medical History:   Diagnosis Date    Arthritis     Asthma     Cancer ([facility] [address]     squamous cell skin cancer    Chest pain, unspecified 12/20/2010    Coagulation defects     bleeding as child due to lack of vitamin K - no problems now    Ear problems     GERD (gastroesophageal reflux disease)     HTN (hypertension), benign 12/20/2010    Mixed hyperlipidemia 12/20/2010    Nonspecific abnormal unspecified cardiovascular function study 12/20/2010    Obesity (BMI 30-39. 9)     Other ill-defined conditions(799.89)     low blood sugar    Other ill-defined conditions(799.89)     some hearing loss - does not wear hearing aids    SOB (shortness of breath) 12/20/2010      Past Surgical History:   Procedure Laterality Date    HX GYN      D & C    HX HEENT      cataract implant both eyes    HX HEENT      all teeth pulled    HX ORTHOPAEDIC      bunionectomy right foot    HX ORTHOPAEDIC  1/2012    bunionectomy left foot    HX OTHER SURGICAL      squamous cell skin cancer removed x 2     Allergies   Allergen Reactions    Amoxicillin Rash    Ketotifen Other (comments)     Redness of eye, irritation.  Lisinopril Cough    Losartan Other (comments)    Wheat Other (comments)      Family History   Problem Relation Age of Onset   [place] Parkinsonism Sister     Parkinsonism Sister     Cancer Mother         lymphoma    Coronary Artery Disease Father     Depression Maternal Aunt     Diabetes Maternal Grandmother       Current Outpatient Medications   Medication Sig    alendronate (FOSAMAX) 70 mg tablet Take 70 mg by mouth every seven (7) days.  fluticasone furoate-vilanteroL (Breo Ellipta) 200-25 mcg/dose inhaler Take 1 Puff by inhalation daily.  meloxicam (MOBIC) 15 mg tablet Take 15 mg by mouth daily.  atorvastatin (LIPITOR) 40 mg tablet Take 1 Tab by mouth daily.  amLODIPine (NORVASC) 5 mg tablet Take 1 Tab by mouth daily.  chlorthalidone (HYGROTEN) 25 mg tablet Take 1 Tab by mouth daily.  fluticasone furoate-vilanterol (BREO ELLIPTA) 100-25 mcg/dose inhaler Take 1 Puff by inhalation daily.  azelastine-fluticasone (DYMISTA) 137-50 mcg/spray spry by Nasal route.  calcium-cholecalciferol, d3, (CALCIUM 600 + D) 600-125 mg-unit tab Take 1 Tab by mouth two (2) times a day.  montelukast (SINGULAIR) 10 mg tablet Take 10 mg by mouth daily.  SOD CHLOR&BICARB/SQUEEZ BOTTLE (NEILMED PEDIATRIC SINUS RINSE NA) by Nasal route.  fluticasone (FLONASE) 50 mcg/actuation nasal spray nightly.  albuterol (Proventil HFA) 90 mcg/actuation inhaler Take 2 Puffs by inhalation every six (6) hours as needed.  omeprazole (PRILOSEC) 40 mg capsule Take 40 mg by mouth daily.  multivitamins-minerals-lutein (CENTRUM SILVER) Tab Take 1 Tab by mouth daily.  GLUCOSAMINE HCL/CHONDRO GARCIA A (GLUCOSAMINE-CHONDROITIN PO) Take  by mouth two (2) times a day. No current facility-administered medications for this visit. There were no vitals filed for this visit.   Social History     Socioeconomic History    Marital status:      Spouse name: Not on file    Number of children: 0    Years of education: Not on file    Highest education level: Not on file   Occupational History    Not on file   Social Needs    Financial resource strain: Not on file    Food insecurity     Worry: Not on file     Inability: Not on file    Transportation needs     Medical: Not on file     Non-medical: Not on file   Tobacco Use    Smoking status: Never Smoker    Smokeless tobacco: Never Used   Substance and Sexual Activity    Alcohol use: Yes     Alcohol/week: 0.8 standard drinks     Types: 1 Glasses of wine per week     Frequency: 2-3 times a week     Comment: glass of wine every 1-2 weeks    Drug use: No    Sexual activity: Not on file   Lifestyle    Physical activity     Days per week: Not on file     Minutes per session: Not on file    Stress: Not on file   Relationships    Social connections     Talks on phone: Not on file     Gets together: Not on file     Attends [employer] service: Not on file     Active member of club or organization: Not on file     Attends meetings of clubs or organizations: Not on file     Relationship status: Not on file    Intimate partner violence     Fear of current or ex partner: Not on file     Emotionally abused: Not on file     Physically abused: Not on file     Forced sexual activity: Not on file   Other Topics Concern    Not on file   Social History Narrative    Not on file       Review of Symptoms  11 systems reviewed, negative other than as stated in the HPI    Physical Exam:    Due to this being a TeleHealth evaluation, many elements of the physical examination are unable to be assessed. General: Well developed, in no acute distress, cooperative and alert  HEENT: Pupils equal/round. No marked JVD visible on video.   Respiratory: No audible wheezing, no signs of respiratory distress, lips non cyanotic  Extremities:  No edema  Neuro: A&Ox3, speech clear, no facial droop, answering questions appropriately  Skin: Skin color is normal. No rashes or lesions. Non diaphoretic on visible skin during exam      Cardiology Labs:  Lab Results   Component Value Date/Time    Cholesterol, total 136 01/19/2015 12:00 AM    HDL Cholesterol 29 (L) 01/19/2015 12:00 AM    LDL, calculated 84 01/19/2015 12:00 AM    Triglyceride 114 01/19/2015 12:00 AM       Lab Results   Component Value Date/Time    Sodium 143 01/19/2015 12:00 AM    Potassium 3.5 01/19/2015 12:00 AM    Chloride 98 01/19/2015 12:00 AM    CO2 28 01/19/2015 12:00 AM    Anion gap 9 01/06/2010 09:05 AM    Glucose 102 (H) 01/19/2015 12:00 AM    BUN 21 01/19/2015 12:00 AM    Creatinine 0.80 01/19/2015 12:00 AM    BUN/Creatinine ratio 26 01/19/2015 12:00 AM    GFR est AA 81 01/19/2015 12:00 AM    GFR est non-AA 70 01/19/2015 12:00 AM    Calcium 9.0 01/19/2015 12:00 AM    Bilirubin, total 0.5 01/19/2015 12:00 AM    Alk. phosphatase 67 01/19/2015 12:00 AM    Protein, total 6.5 01/19/2015 12:00 AM    Albumin 4.1 01/19/2015 12:00 AM    A-G Ratio 1.7 01/19/2015 12:00 AM    ALT (SGPT) 24 01/19/2015 12:00 AM         Assessment:     Diagnoses and all orders for this visit:    1. HTN (hypertension), benign    2. Mixed hyperlipidemia    3. SOB (shortness of breath)        ICD-10-CM ICD-9-CM    1. HTN (hypertension), benign  I10 401.1    2. Mixed hyperlipidemia  E78.2 272.2    3. SOB (shortness of breath)  R06.02 786.05      No orders of the defined types were placed in this encounter. Plan:   She is stable and I think asymptomatic from a cardiac perspective well compensated on a good medical regimen and needs no cardiac testing at this time. She needs a refill on her chlorthalidone.         current treatment plan is effective, no change in therapy  lab results and schedule of future lab studies reviewed with patient  reviewed diet, exercise and weight control  We discussed the expected course, resolution and complications of the diagnosis(es) in detail. Medication risks, benefits, costs, interactions, and alternatives were discussed as indicated. I advised her to contact the office if her condition worsens, changes or fails to improve as anticipated. She expressed understanding with the diagnosis(es) and plan    Randa Alegria MD    Greater than 20 minutes was spent in direct video patient care, planning and chart review. This visit was conducted using [employer] telemedicine services.

## 2020-08-12 NOTE — PROGRESS NOTES
MD Jay Cifuentes, RN               She needs 90 days on chlorthalidone. 1 yr office visit      Called patient to schedule 1 yr f/u. Mailbox was full so unable to leave message. Will try back, but also mailing appointment.

## 2020-09-10 ENCOUNTER — OFFICE VISIT (OUTPATIENT)
Dept: FAMILY MEDICINE CLINIC | Age: 85
End: 2020-09-10
Payer: MEDICARE

## 2020-09-10 VITALS
TEMPERATURE: 97.4 F | WEIGHT: 171 LBS | HEART RATE: 78 BPM | SYSTOLIC BLOOD PRESSURE: 138 MMHG | DIASTOLIC BLOOD PRESSURE: 70 MMHG | HEIGHT: 63 IN | OXYGEN SATURATION: 97 % | BODY MASS INDEX: 30.3 KG/M2

## 2020-09-10 DIAGNOSIS — I10 HTN (HYPERTENSION), BENIGN: Primary | ICD-10-CM

## 2020-09-10 PROCEDURE — 99213 OFFICE O/P EST LOW 20 MIN: CPT | Performed by: NURSE PRACTITIONER

## 2020-09-10 RX ORDER — TRIAMCINOLONE ACETONIDE 1 MG/G
OINTMENT TOPICAL
COMMUNITY
Start: 2020-08-18 | End: 2021-01-19

## 2020-09-10 NOTE — PROGRESS NOTES
Subjective  Chief Complaint   Patient presents with    Follow-up     HTN     HPI:  Laci Bangura is a 80 y.o. female. Patient presents for management of hypertension. Amlodipine and Chlorthalidone daily with no known side effects. Reported home BP readings 130s/70s, hasn't been checking regularly. Reports mild dizziness when changing positions, possibly due to known vertigo. Reports SOB attributed to COPD. Denies lightheadedness, chest pain, palpitations, and LE edema. Tries to stay hydrated. Limits salt. Exercise is limited due to gym closure. Past Medical History:   Diagnosis Date    Arthritis     Asthma     Cancer ([facility] [address]     squamous cell skin cancer    Chest pain, unspecified 12/20/2010    Coagulation defects     bleeding as child due to lack of vitamin K - no problems now    Ear problems     GERD (gastroesophageal reflux disease)     HTN (hypertension), benign 12/20/2010    Mixed hyperlipidemia 12/20/2010    Nonspecific abnormal unspecified cardiovascular function study 12/20/2010    Obesity (BMI 30-39. 9)     Other ill-defined conditions(799.89)     low blood sugar    Other ill-defined conditions(799.89)     some hearing loss - does not wear hearing aids    SOB (shortness of breath) 12/20/2010     Family History   Problem Relation Age of Onset    Parkinsonism Sister     Parkinsonism Sister     Cancer Mother         lymphoma    Coronary Artery Disease Father     Depression Maternal Aunt     Diabetes Maternal Grandmother      Social History     Socioeconomic History    Marital status:      Spouse name: Not on file    Number of children: 0    Years of education: Not on file    Highest education level: Not on file   Occupational History    Not on file   Social Needs    Financial resource strain: Not on file    Food insecurity     Worry: Not on file     Inability: Not on file    Transportation needs     Medical: Not on file     Non-medical: Not on file   Tobacco Use  Smoking status: Never Smoker    Smokeless tobacco: Never Used   Substance and Sexual Activity    Alcohol use: Yes     Alcohol/week: 0.8 standard drinks     Types: 1 Glasses of wine per week     Frequency: 2-3 times a week     Comment: glass of wine every 1-2 weeks    Drug use: No    Sexual activity: Not on file   Lifestyle    Physical activity     Days per week: Not on file     Minutes per session: Not on file    Stress: Not on file   Relationships    Social connections     Talks on phone: Not on file     Gets together: Not on file     Attends Congregational service: Not on file     Active member of club or organization: Not on file     Attends meetings of clubs or organizations: Not on file     Relationship status: Not on file    Intimate partner violence     Fear of current or ex partner: Not on file     Emotionally abused: Not on file     Physically abused: Not on file     Forced sexual activity: Not on file   Other Topics Concern    Not on file   Social History Narrative    Not on file     Current Outpatient Medications on File Prior to Visit   Medication Sig Dispense Refill    chlorthalidone (HYGROTEN) 25 mg tablet Take 1 Tab by mouth daily. 90 Tab 3    alendronate (FOSAMAX) 70 mg tablet Take 70 mg by mouth every seven (7) days.  fluticasone furoate-vilanteroL (Breo Ellipta) 200-25 mcg/dose inhaler Take 1 Puff by inhalation daily.  meloxicam (MOBIC) 15 mg tablet Take 15 mg by mouth daily.  atorvastatin (LIPITOR) 40 mg tablet Take 1 Tab by mouth daily. 90 Tab 3    amLODIPine (NORVASC) 5 mg tablet Take 1 Tab by mouth daily. 90 Tab 3    azelastine-fluticasone (DYMISTA) 137-50 mcg/spray spry by Nasal route.  calcium-cholecalciferol, d3, (CALCIUM 600 + D) 600-125 mg-unit tab Take 1 Tab by mouth two (2) times a day.  montelukast (SINGULAIR) 10 mg tablet Take 10 mg by mouth daily.  SOD CHLOR&BICARB/SQUEEZ BOTTLE (NEILMED PEDIATRIC SINUS RINSE NA) by Nasal route.       albuterol (Proventil HFA) 90 mcg/actuation inhaler Take 2 Puffs by inhalation every six (6) hours as needed.  omeprazole (PRILOSEC) 40 mg capsule Take 40 mg by mouth daily.  multivitamins-minerals-lutein (CENTRUM SILVER) Tab Take 1 Tab by mouth daily.  GLUCOSAMINE HCL/CHONDRO GARCIA A (GLUCOSAMINE-CHONDROITIN PO) Take  by mouth two (2) times a day.  triamcinolone acetonide (KENALOG) 0.1 % ointment APPLY OINTMENT TWICE DAILY TO GENITAL AREA FOR NO MORE THAN 2 WEEKS CONSISTENTLY      [DISCONTINUED] fluticasone furoate-vilanterol (BREO ELLIPTA) 100-25 mcg/dose inhaler Take 1 Puff by inhalation daily.  [DISCONTINUED] fluticasone (FLONASE) 50 mcg/actuation nasal spray nightly. No current facility-administered medications on file prior to visit. Allergies   Allergen Reactions    Amoxicillin Rash    Ketotifen Other (comments)     Redness of eye, irritation.  Lisinopril Cough    Losartan Other (comments)    Wheat Other (comments)     ROS  See HPI for pertinent ROS. Objective  Physical Exam  Vitals signs and nursing note reviewed. Constitutional:       General: She is not in acute distress. Appearance: Normal appearance. She is obese. HENT:      Head: Normocephalic. Eyes:      Extraocular Movements: Extraocular movements intact. Cardiovascular:      Rate and Rhythm: Normal rate and regular rhythm. Heart sounds: Normal heart sounds. Pulmonary:      Effort: Pulmonary effort is normal.      Breath sounds: Normal breath sounds. Musculoskeletal: Normal range of motion. Right lower leg: No edema. Left lower leg: No edema. Skin:     General: Skin is warm and dry. Neurological:      Mental Status: She is alert and oriented to person, place, and time. Psychiatric:         Mood and Affect: Mood normal.         Behavior: Behavior normal.          Assessment & Plan      ICD-10-CM ICD-9-CM    1.  HTN (hypertension), benign  I10 401.1      Diagnoses and all orders for this visit:    1. HTN (hypertension), benign  BP is below goal in the office today and on reported home readings. No medication changes. Continue to check readings regularly and call if consistently greater than 150/90 on either number. Stay well-hydrated to prevent dizziness.     Follow-up and Dispositions    · Return in about 6 months (around 3/10/2021) for Karishma Watkins, fasting labs, follow up, hypertension, lipids etc.           Fredi Banks NP

## 2020-09-10 NOTE — PATIENT INSTRUCTIONS
Learning About High Blood Pressure What is high blood pressure? Blood pressure is a measure of how hard the blood pushes against the walls of your arteries. It's normal for blood pressure to go up and down throughout the day. But if it stays up, you have high blood pressure. Another name for high blood pressure is hypertension. Two numbers tell you your blood pressure. The first number is the systolic pressure (top number). It shows how hard the blood pushes when your heart is pumping. The second number is the diastolic pressure (bottom number). It shows how hard the blood pushes between heartbeats, when your heart is relaxed and filling with blood. Your doctor will give you a goal for your blood pressure based on your health and your age. High blood pressure (hypertension) means that the top number stays high, or the bottom number stays high, or both. High blood pressure increases the risk of stroke, heart attack, and other problems. What happens when you have high blood pressure? · Blood flows through your arteries with too much force. Over time, this can damage the heart and the walls of your arteries. But you can't feel it. High blood pressure usually doesn't cause symptoms. · High blood pressure makes your heart work harder. And that can lead to heart failure, which means your heart doesn't pump as much blood as your body needs. · Fat and calcium start to build up in your arteries. This buildup is called hardening of the arteries. It can cause many problems including a heart attack and stroke. · Arteries also carry blood and oxygen to organs like your eyes, kidneys, and brain. If high blood pressure damages those arteries, it can lead to vision loss, kidney disease, stroke, and a higher risk of dementia. How can you prevent high blood pressure? · Stay at a healthy weight. · Try to limit how much sodium you eat to less than 2,300 milligrams (mg) a day. If you limit your sodium to 1,500 mg a day, you can lower your blood pressure even more. ? Buy foods that are labeled \"unsalted,\" \"sodium-free,\" or \"low-sodium. \" Foods labeled \"reduced-sodium\" and \"light sodium\" may still have too much sodium. ? Flavor your food with garlic, lemon juice, onion, vinegar, herbs, and spices instead of salt. Do not use soy sauce, steak sauce, onion salt, garlic salt, mustard, or ketchup on your food. ? Use less salt (or none) when recipes call for it. You can often use half the salt a recipe calls for without losing flavor. · Be physically active. Get at least 30 minutes of exercise on most days of the week. Walking is a good choice. You also may want to do other activities, such as running, swimming, cycling, or playing tennis or team sports. · Limit alcohol to 2 drinks a day for men and 1 drink a day for women. · Eat plenty of fruits, vegetables, and low-fat dairy products. Eat less saturated and total fats. How is high blood pressure treated? · Your doctor will suggest making lifestyle changes to help your heart. For example, your doctor may ask you to eat healthy foods, quit smoking, lose extra weight, and be more active. · If lifestyle changes don't help enough, your doctor may recommend that you take medicine. · When blood pressure is very high, medicines are needed to lower it. Follow-up care is a key part of your treatment and safety. Be sure to make and go to all appointments, and call your doctor if you are having problems. It's also a good idea to know your test results and keep a list of the medicines you take. Where can you learn more? Go to http://www.TipTap.com/ Enter P501 in the search box to learn more about \"Learning About High Blood Pressure. \" Current as of: December 16, 2019               Content Version: 12.6 © 4331-3908 [employer]. Care instructions adapted under license by [employer] (which disclaims liability or warranty for this information). If you have questions about a medical condition or this instruction, always ask your healthcare professional. [address] any warranty or liability for your use of this information.

## 2020-12-01 ENCOUNTER — TELEPHONE (OUTPATIENT)
Dept: FAMILY MEDICINE CLINIC | Age: 85
End: 2020-12-01

## 2020-12-01 NOTE — TELEPHONE ENCOUNTER
[facility] care manager is Alda Elizalde from [address] [place] [address],[address]. [phone]. Patient had a fall in the last 6 months. She was mowing stumbled and fell with minor injuries. They will fax the information to [id number] [place].

## 2021-01-19 ENCOUNTER — OFFICE VISIT (OUTPATIENT)
Dept: FAMILY MEDICINE CLINIC | Age: 86
End: 2021-01-19
Payer: MEDICARE

## 2021-01-19 VITALS
TEMPERATURE: 98.4 F | HEIGHT: 64 IN | SYSTOLIC BLOOD PRESSURE: 138 MMHG | WEIGHT: 167.13 LBS | OXYGEN SATURATION: 97 % | DIASTOLIC BLOOD PRESSURE: 88 MMHG | HEART RATE: 84 BPM | BODY MASS INDEX: 28.53 KG/M2

## 2021-01-19 DIAGNOSIS — Z87.440 HISTORY OF UTI: Primary | ICD-10-CM

## 2021-01-19 DIAGNOSIS — N89.8 VAGINAL ITCHING: ICD-10-CM

## 2021-01-19 LAB
BILIRUB UR QL STRIP: NEGATIVE
GLUCOSE UR-MCNC: NEGATIVE MG/DL
KETONES P FAST UR STRIP-MCNC: NEGATIVE MG/DL
PH UR STRIP: 6 [PH] (ref 4.6–8)
PROT UR QL STRIP: NEGATIVE
SP GR UR STRIP: 1.02 (ref 1–1.03)
UA UROBILINOGEN AMB POC: NORMAL (ref 0.2–1)
URINALYSIS CLARITY POC: CLEAR
URINALYSIS COLOR POC: YELLOW
URINE BLOOD POC: NORMAL
URINE LEUKOCYTES POC: NORMAL
URINE NITRITES POC: NEGATIVE

## 2021-01-19 PROCEDURE — 99213 OFFICE O/P EST LOW 20 MIN: CPT | Performed by: NURSE PRACTITIONER

## 2021-01-19 PROCEDURE — 81003 URINALYSIS AUTO W/O SCOPE: CPT | Performed by: NURSE PRACTITIONER

## 2021-01-19 RX ORDER — FLUCONAZOLE 150 MG/1
150 TABLET ORAL DAILY
Qty: 1 TAB | Refills: 0 | Status: SHIPPED | OUTPATIENT
Start: 2021-01-19 | End: 2021-01-20

## 2021-01-19 NOTE — PROGRESS NOTES
Subjective  Chief Complaint   Patient presents with    Follow-up     follow up UTI     HPI:  Omero Ndiaye is a 80 y.o. female. This patient is here for urgent care follow up. Discharge summary not available for review by me today. Visit dates: 1/1/2020 at [facility]    Discharge diagnosis: UTI    Treatment: Started on Augmentin. Antibiotic was stopped and changed to Bactrim for 7 days when culture results were received. Specialist follow up appointments: PCP today    Update: Since discharge patient denies any fevers, shortness of breath, lower extremity edema, diarrhea, and constipation. Medication lists reconciled, home meds have been resumed with no questions. Antibiotics were completed one week ago. Denies ongoing UTI symptoms. Reports vaginal burning and itching. Denies vaginal discharge.      Past Medical History:   Diagnosis Date    Arthritis     Asthma     Cancer ([facility] [address]     squamous cell skin cancer    Chest pain, unspecified     Coagulation defects     bleeding as child due to lack of vitamin K - no problems now    GERD (gastroesophageal reflux disease)     Hearing loss     wears hearing aides    History of bone density study 01/26/2009    HTN (hypertension), benign     Mixed hyperlipidemia     Nonspecific abnormal unspecified cardiovascular function study 12/20/2010    Other ill-defined conditions(799.89)     low blood sugar     Family History   Problem Relation Age of Onset    Parkinsonism Sister     Parkinsonism Sister     Cancer Mother         lymphoma    Coronary Artery Disease Father     Depression Maternal Aunt     Diabetes Maternal Grandmother      Social History     Socioeconomic History    Marital status:      Spouse name: Not on file    Number of children: 0    Years of education: Not on file    Highest education level: Not on file   Occupational History    Not on file   Social Needs    Financial resource strain: Not on file    Food insecurity Worry: Not on file     Inability: Not on file    Transportation needs     Medical: Not on file     Non-medical: Not on file   Tobacco Use    Smoking status: Never Smoker    Smokeless tobacco: Never Used   Substance and Sexual Activity    Alcohol use: Yes     Alcohol/week: 0.8 standard drinks     Types: 1 Glasses of wine per week     Frequency: 2-3 times a week     Comment: glass of wine every 1-2 weeks    Drug use: No    Sexual activity: Not on file   Lifestyle    Physical activity     Days per week: Not on file     Minutes per session: Not on file    Stress: Not on file   Relationships    Social connections     Talks on phone: Not on file     Gets together: Not on file     Attends [employer] service: Not on file     Active member of club or organization: Not on file     Attends meetings of clubs or organizations: Not on file     Relationship status: Not on file    Intimate partner violence     Fear of current or ex partner: Not on file     Emotionally abused: Not on file     Physically abused: Not on file     Forced sexual activity: Not on file   Other Topics Concern    Not on file   Social History Narrative    Not on file     Current Outpatient Medications on File Prior to Visit   Medication Sig Dispense Refill    chlorthalidone (HYGROTEN) 25 mg tablet Take 1 Tab by mouth daily. 90 Tab 3    alendronate (FOSAMAX) 70 mg tablet Take 70 mg by mouth every seven (7) days.  fluticasone furoate-vilanteroL (Breo Ellipta) 200-25 mcg/dose inhaler Take 1 Puff by inhalation daily.  meloxicam (MOBIC) 15 mg tablet Take 15 mg by mouth daily.  atorvastatin (LIPITOR) 40 mg tablet Take 1 Tab by mouth daily. 90 Tab 3    amLODIPine (NORVASC) 5 mg tablet Take 1 Tab by mouth daily. 90 Tab 3    azelastine-fluticasone (DYMISTA) 137-50 mcg/spray spry by Nasal route.  calcium-cholecalciferol, d3, (CALCIUM 600 + D) 600-125 mg-unit tab Take 1 Tab by mouth two (2) times a day.       montelukast (SINGULAIR) 10 mg tablet Take 10 mg by mouth daily.  albuterol (Proventil HFA) 90 mcg/actuation inhaler Take 2 Puffs by inhalation every six (6) hours as needed.  omeprazole (PRILOSEC) 40 mg capsule Take 40 mg by mouth daily.  multivitamins-minerals-lutein (CENTRUM SILVER) Tab Take 1 Tab by mouth daily.  GLUCOSAMINE HCL/CHONDRO GARCIA A (GLUCOSAMINE-CHONDROITIN PO) Take  by mouth two (2) times a day.  [DISCONTINUED] triamcinolone acetonide (KENALOG) 0.1 % ointment APPLY OINTMENT TWICE DAILY TO GENITAL AREA FOR NO MORE THAN 2 WEEKS CONSISTENTLY      [DISCONTINUED] SOD CHLOR&BICARB/SQUEEZ BOTTLE (NEILMED PEDIATRIC SINUS RINSE NA) by Nasal route. No current facility-administered medications on file prior to visit. Allergies   Allergen Reactions    Amoxicillin Rash    Ketotifen Other (comments)     Redness of eye, irritation.  Lisinopril Cough    Losartan Other (comments)    Wheat Other (comments)     ROS  See HPI for pertinent ROS. Objective  Physical Exam  Vitals signs and nursing note reviewed. Constitutional:       General: She is not in acute distress. Appearance: Normal appearance. She is overweight. HENT:      Head: Normocephalic. Eyes:      Extraocular Movements: Extraocular movements intact. Cardiovascular:      Rate and Rhythm: Normal rate and regular rhythm. Heart sounds: Normal heart sounds. Pulmonary:      Effort: Pulmonary effort is normal.      Breath sounds: Normal breath sounds. Abdominal:      Tenderness: There is no abdominal tenderness. There is no right CVA tenderness or left CVA tenderness. Musculoskeletal: Normal range of motion. Right lower leg: No edema. Left lower leg: No edema. Skin:     General: Skin is warm and dry. Neurological:      Mental Status: She is alert and oriented to person, place, and time.    Psychiatric:         Mood and Affect: Mood normal.         Behavior: Behavior normal.          Assessment & Plan ICD-10-CM ICD-9-CM    1. History of UTI  Z87.440 V13.02 AMB POC URINALYSIS DIP STICK AUTO W/O MICRO      CULTURE, URINE   2. Vaginal itching  N89.8 698.1 fluconazole (DIFLUCAN) 150 mg tablet     Diagnoses and all orders for this visit:    1. History of UTI  Dipstick and symptoms are consistent with possible ongoing UTI. We will culture urine before treating. Increase water intake and do not hold urine for long periods of time. -     AMB POC URINALYSIS DIP STICK AUTO W/O MICRO  -     CULTURE, URINE    2. Vaginal itching  Dipstick and symptoms are consistent with possible yeast infection especially given her history of antibiotic use. We discussed yeast infections can mimic UTIs. Medication as ordered while urine culture is pending.  -     fluconazole (DIFLUCAN) 150 mg tablet; Take 1 Tab by mouth daily for 1 day. FDA advises cautious prescribing of oral fluconazole in pregnancy.             Suzi Auguste NP

## 2021-01-22 DIAGNOSIS — N39.0 URINARY TRACT INFECTION WITHOUT HEMATURIA, SITE UNSPECIFIED: Primary | ICD-10-CM

## 2021-01-22 LAB — BACTERIA UR CULT: ABNORMAL

## 2021-01-22 RX ORDER — NITROFURANTOIN 25; 75 MG/1; MG/1
100 CAPSULE ORAL 2 TIMES DAILY
Qty: 10 CAP | Refills: 0 | Status: SHIPPED | OUTPATIENT
Start: 2021-01-22 | End: 2021-01-27

## 2021-01-22 NOTE — PROGRESS NOTES
Urine culture is positive for another UTI. I am sending Macrobid to her pharmacy for 5 days. If her symptoms do not resolve, I recommend follow-up with urology.

## 2021-03-18 ENCOUNTER — OFFICE VISIT (OUTPATIENT)
Dept: FAMILY MEDICINE CLINIC | Age: 86
End: 2021-03-18
Payer: MEDICARE

## 2021-03-18 VITALS
HEIGHT: 64 IN | TEMPERATURE: 96.5 F | SYSTOLIC BLOOD PRESSURE: 140 MMHG | WEIGHT: 163.38 LBS | DIASTOLIC BLOOD PRESSURE: 80 MMHG | HEART RATE: 81 BPM | BODY MASS INDEX: 27.89 KG/M2 | OXYGEN SATURATION: 96 % | RESPIRATION RATE: 16 BRPM

## 2021-03-18 DIAGNOSIS — F99 ABNORMAL MINI-MENTAL STATUS EXAM: ICD-10-CM

## 2021-03-18 DIAGNOSIS — Z00.00 MEDICARE ANNUAL WELLNESS VISIT, SUBSEQUENT: Primary | ICD-10-CM

## 2021-03-18 DIAGNOSIS — E66.3 OVERWEIGHT WITH BODY MASS INDEX (BMI) OF 28 TO 28.9 IN ADULT: ICD-10-CM

## 2021-03-18 DIAGNOSIS — Z23 ENCOUNTER FOR IMMUNIZATION: ICD-10-CM

## 2021-03-18 DIAGNOSIS — Z13.39 ALCOHOL SCREENING: ICD-10-CM

## 2021-03-18 DIAGNOSIS — E78.2 MIXED HYPERLIPIDEMIA: ICD-10-CM

## 2021-03-18 DIAGNOSIS — M85.80 OSTEOPENIA WITH HIGH RISK OF FRACTURE: ICD-10-CM

## 2021-03-18 DIAGNOSIS — I10 HTN (HYPERTENSION), BENIGN: ICD-10-CM

## 2021-03-18 DIAGNOSIS — Z13.31 DEPRESSION SCREENING: ICD-10-CM

## 2021-03-18 DIAGNOSIS — Z91.81 AT LOW RISK FOR FALL: ICD-10-CM

## 2021-03-18 PROCEDURE — 99213 OFFICE O/P EST LOW 20 MIN: CPT | Performed by: NURSE PRACTITIONER

## 2021-03-18 PROCEDURE — 1090F PRES/ABSN URINE INCON ASSESS: CPT | Performed by: NURSE PRACTITIONER

## 2021-03-18 PROCEDURE — G8536 NO DOC ELDER MAL SCRN: HCPCS | Performed by: NURSE PRACTITIONER

## 2021-03-18 PROCEDURE — 1101F PT FALLS ASSESS-DOCD LE1/YR: CPT | Performed by: NURSE PRACTITIONER

## 2021-03-18 PROCEDURE — G8427 DOCREV CUR MEDS BY ELIG CLIN: HCPCS | Performed by: NURSE PRACTITIONER

## 2021-03-18 PROCEDURE — G0439 PPPS, SUBSEQ VISIT: HCPCS | Performed by: NURSE PRACTITIONER

## 2021-03-18 PROCEDURE — G8417 CALC BMI ABV UP PARAM F/U: HCPCS | Performed by: NURSE PRACTITIONER

## 2021-03-18 PROCEDURE — G8432 DEP SCR NOT DOC, RNG: HCPCS | Performed by: NURSE PRACTITIONER

## 2021-03-18 RX ORDER — ESTRADIOL 0.1 MG/G
CREAM VAGINAL
COMMUNITY
Start: 2021-03-02

## 2021-03-18 RX ORDER — FLUTICASONE PROPIONATE AND SALMETEROL 500; 50 UG/1; UG/1
POWDER RESPIRATORY (INHALATION)
COMMUNITY
Start: 2021-03-01 | End: 2022-04-22 | Stop reason: ALTCHOICE

## 2021-03-18 RX ORDER — FLUTICASONE PROPIONATE 50 MCG
2 SPRAY, SUSPENSION (ML) NASAL DAILY
COMMUNITY
End: 2022-08-29 | Stop reason: ALTCHOICE

## 2021-03-18 NOTE — PROGRESS NOTES
Medicare Wellness Exam:    Chief Complaint   Patient presents with    Annual Wellness Visit     medicare     she is a 80y.o. year old female who presents for evaluation for their Medicare Wellness Visit. Patient presents for Medicare wellness, fasting labs, and management of osteopenia with high fracture risk. Medications reviewed, taking as prescribed with no known side effects. Follows with pulmonary for management of asthma and allergies. Follows with GI for management of GERD. Follows with urology for management of Estradiol cream. Now following with cardiology for management of HTN and hyperlipidemia. Requesting to have labs checked today and sent to Dr. Laurance Hatchet. Fall Screen is completed and assessed=yes  Depression Screen is completed and assessed=yes  Medication list reviewed and adjusted for accuracy=yes  Immunizations reviewed and updated=yes  Health/Preventative Screenings reviewed and updated=yes  ADL Functions reviewed=yes  MiniCog score= 4/5 (2points for clock, 3/3 for recall)  See scanned medicare wellness documents for full details. Patient Active Problem List    Diagnosis    Overweight    Arthritis    GERD (gastroesophageal reflux disease)    Arthritis of lumbar spine    Severe persistent asthma    Celiac disease    Hypolipoproteinemia    Osteopenia with high risk of fracture    Nonspecific abnormal results of cardiovascular function study     Ms. Coral Adler was evaluated for atypical chest pain and an abnormal stress test in April 2008. She underwent cardiac catheterization, which showed good LV function and minimal coronary disease      Mixed hyperlipidemia    HTN (hypertension), benign       Reviewed PmHx, RxHx, FmHx, SocHx, AllgHx and updated and dated in the chart. Review of Systems   Constitutional: Negative for chills, fever and weight loss. HENT: Positive for congestion (year round allergies) and hearing loss. Negative for ear pain, sinus pain and sore throat. Denies difficulty swallowing. Eyes: Negative for blurred vision. Respiratory: Positive for cough (asthma and drainage). Negative for shortness of breath and wheezing. Cardiovascular: Negative for chest pain, palpitations, claudication and leg swelling. Gastrointestinal: Negative for abdominal pain, constipation, diarrhea and heartburn. Genitourinary: Negative for dysuria. Musculoskeletal: Positive for joint pain. Negative for myalgias. Neurological: Positive for tingling (right leg, bilateral hands). Negative for dizziness, weakness and headaches. Psychiatric/Behavioral: Negative for depression. The patient is not nervous/anxious. Objective:     Vitals:    03/18/21 0838   BP: (!) 140/80   Pulse: 81   Resp: 16   Temp: (!) 96.5 °F (35.8 °C)   TempSrc: Temporal   SpO2: 96%   Weight: 163 lb 6 oz (74.1 kg)   Height: 5' 4\" (1.626 m)     Physical Exam  Vitals signs and nursing note reviewed. Constitutional:       General: She is not in acute distress. Appearance: Normal appearance. HENT:      Head: Normocephalic. Eyes:      Extraocular Movements: Extraocular movements intact. Neck:      Musculoskeletal: Neck supple. Thyroid: No thyroid mass, thyromegaly or thyroid tenderness. Cardiovascular:      Rate and Rhythm: Normal rate and regular rhythm. Heart sounds: Normal heart sounds. Pulmonary:      Effort: Pulmonary effort is normal.      Breath sounds: Normal breath sounds. Musculoskeletal: Normal range of motion. Right lower leg: No edema. Left lower leg: No edema. Lymphadenopathy:      Cervical: No cervical adenopathy. Upper Body:      Right upper body: No supraclavicular adenopathy. Left upper body: No supraclavicular adenopathy. Skin:     General: Skin is warm and dry. Neurological:      Mental Status: She is alert and oriented to person, place, and time.    Psychiatric:         Mood and Affect: Mood normal.         Behavior: Behavior normal.          Assessment/ Plan:   Diagnoses and all orders for this visit:    1. Medicare annual wellness visit, subsequent  [facility] exam completed as documented. 2. Depression screening  Negative depression screening. 3. Alcohol screening  Low risk for alcohol misuse. 4. At low risk for fall  Low fall risk. 5. Overweight with body mass index (BMI) of 28 to 28.9 in adult  Stay active as tolerated and eat a healthy diet. 6. Abnormal mini-mental status exam  Not a concern for the patient. Declines further evaluation. 7. Encounter for immunization  Advised to receive Shingrix vaccine from pharmacy and cautioned there may be an out-of-pocket expense. 8. Osteopenia with high risk of fracture  Bone density scan completed 2020. Continue Fosamax weekly. 9. HTN (hypertension), benign  BP is below goal in the office today per JNC 8 guidelines. Follows with cardiology for management. Labs as requested. -     METABOLIC PANEL, COMPREHENSIVE    10. Mixed hyperlipidemia  Follows with cardiology for management. Labs as requested.   -     CBC WITH AUTOMATED DIFF  -     LIPID PANEL         -Pain evaluation performed in office  -Cognitive Screen performed in office  -Depression Screen, Fall risks (by up and go test)  and ADL functionality were addressed  -Medication list updated and reviewed for any changes   -A comprehensive review of medical issues and a plan was formulated  -End of life planning was addressed with pt   -Health Screenings for preventions were addressed and a plan was formulated  -Shingles Vaccine was recommended  -Discussed with patient cancer risk factors and appropriate screenings for age  -Patient evaluated for colonoscopy and referred if needed per screeing criteria  -Labs from previous visits were discussed with patient   -Discussed with patient diet and exercise and formulated a plan as needed  -An Advanced care plan was developed with the patient.  -Alcohol screening performed and was negative    -  Follow-up and Dispositions    · Return in about 1 year (around 3/18/2022) for Karishma Watkins, fasting labs, follow up. I have discussed the diagnosis with the patient and the intended plan as seen in the above orders. The patient understands and agrees with the plan. The patient has received an after-visit summary and questions were answered concerning future plans.      Medication Side Effects and Warnings were discussed with patien  Patient Labs were reviewed and or requested  Patient Past Records were reviewed and or requested        Rupal KUMAR

## 2021-03-18 NOTE — PATIENT INSTRUCTIONS
Medicare Wellness Visit, Female     The best way to live healthy is to have a lifestyle where you eat a well-balanced diet, exercise regularly, limit alcohol use, and quit all forms of tobacco/nicotine, if applicable.     Regular preventive services are another way to keep healthy. Preventive services (vaccines, screening tests, monitoring & exams) can help personalize your care plan, which helps you manage your own care. Screening tests can find health problems at the earliest stages, when they are easiest to treat.   [facility] follows the current, evidence-based guidelines published by the [place] Preventive Services Task Force (USPSTF) when recommending preventive services for our patients. Because we follow these guidelines, sometimes recommendations change over time as research supports it. (For example, mammograms used to be recommended annually. Even though Medicare will still pay for an annual mammogram, the newer guidelines recommend a mammogram every two years for women of average risk).  Of course, you and your doctor may decide to screen more often for some diseases, based on your risk and your co-morbidities (chronic disease you are already diagnosed with).     Preventive services for you include:  - Medicare offers their members a free annual wellness visit, which is time for you and your primary care provider to discuss and plan for your preventive service needs. Take advantage of this benefit every year!  -All adults over the age of 65 should receive the recommended pneumonia vaccines. Current USPSTF guidelines recommend a series of two vaccines for the best pneumonia protection.   -All adults should have a flu vaccine yearly and a tetanus vaccine every 10 years.   -All adults age 50 and older should receive the shingles vaccines (series of two vaccines).      -All adults age 40-70 who are overweight should have a diabetes screening test once every three years.   -All  adults born between [birth year] and 1965 should be screened once for Hepatitis C.  -Other screening tests and preventive services for persons with diabetes include: an eye exam to screen for diabetic retinopathy, a kidney function test, a foot exam, and stricter control over your cholesterol.   -Cardiovascular screening for adults with routine risk involves an electrocardiogram (ECG) at intervals determined by your doctor.   -Colorectal cancer screenings should be done for adults age 54-65 with no increased risk factors for colorectal cancer. There are a number of acceptable methods of screening for this type of cancer. Each test has its own benefits and drawbacks. Discuss with your doctor what is most appropriate for you during your annual wellness visit. The different tests include: colonoscopy (considered the best screening method), a fecal occult blood test, a fecal DNA test, and sigmoidoscopy.    -A bone mass density test is recommended when a woman turns 65 to screen for osteoporosis. This test is only recommended one time, as a screening. Some providers will use this same test as a disease monitoring tool if you already have osteoporosis. -Breast cancer screenings are recommended every other year for women of normal risk, age 54-69.  -Cervical cancer screenings for women over age 72 are only recommended with certain risk factors.      Here is a list of your current Health Maintenance items (your personalized list of preventive services) with a due date:  Health Maintenance Due   Topic Date Due    DTaP/Tdap/Td  (1 - Tdap) 04/26/1955    Shingles Vaccine (1 of 2) Never done    Cholesterol Test   03/11/2021

## 2021-03-19 LAB
ALBUMIN SERPL-MCNC: 4.2 G/DL (ref 3.6–4.6)
ALBUMIN/GLOB SERPL: 1.4 {RATIO} (ref 1.2–2.2)
ALP SERPL-CCNC: 61 IU/L (ref 39–117)
ALT SERPL-CCNC: 24 IU/L (ref 0–32)
AST SERPL-CCNC: 20 IU/L (ref 0–40)
BASOPHILS # BLD AUTO: 0 X10E3/UL (ref 0–0.2)
BASOPHILS NFR BLD AUTO: 0 %
BILIRUB SERPL-MCNC: 0.5 MG/DL (ref 0–1.2)
BUN SERPL-MCNC: 19 MG/DL (ref 8–27)
BUN/CREAT SERPL: 25 (ref 12–28)
CALCIUM SERPL-MCNC: 9.6 MG/DL (ref 8.7–10.3)
CHLORIDE SERPL-SCNC: 98 MMOL/L (ref 96–106)
CHOLEST SERPL-MCNC: 160 MG/DL (ref 100–199)
CO2 SERPL-SCNC: 25 MMOL/L (ref 20–29)
CREAT SERPL-MCNC: 0.75 MG/DL (ref 0.57–1)
EOSINOPHIL # BLD AUTO: 0 X10E3/UL (ref 0–0.4)
EOSINOPHIL NFR BLD AUTO: 0 %
ERYTHROCYTE [DISTWIDTH] IN BLOOD BY AUTOMATED COUNT: 13 % (ref 11.7–15.4)
GLOBULIN SER CALC-MCNC: 3 G/DL (ref 1.5–4.5)
GLUCOSE SERPL-MCNC: 94 MG/DL (ref 65–99)
HCT VFR BLD AUTO: 41.1 % (ref 34–46.6)
HDLC SERPL-MCNC: 39 MG/DL
HGB BLD-MCNC: 13.8 G/DL (ref 11.1–15.9)
IMM GRANULOCYTES # BLD AUTO: 0 X10E3/UL (ref 0–0.1)
IMM GRANULOCYTES NFR BLD AUTO: 0 %
LDLC SERPL CALC-MCNC: 105 MG/DL (ref 0–99)
LYMPHOCYTES # BLD AUTO: 2.3 X10E3/UL (ref 0.7–3.1)
LYMPHOCYTES NFR BLD AUTO: 23 %
MCH RBC QN AUTO: 31.2 PG (ref 26.6–33)
MCHC RBC AUTO-ENTMCNC: 33.6 G/DL (ref 31.5–35.7)
MCV RBC AUTO: 93 FL (ref 79–97)
MONOCYTES # BLD AUTO: 0.5 X10E3/UL (ref 0.1–0.9)
MONOCYTES NFR BLD AUTO: 5 %
NEUTROPHILS # BLD AUTO: 7.2 X10E3/UL (ref 1.4–7)
NEUTROPHILS NFR BLD AUTO: 72 %
PLATELET # BLD AUTO: 402 X10E3/UL (ref 150–450)
POTASSIUM SERPL-SCNC: 4 MMOL/L (ref 3.5–5.2)
PROT SERPL-MCNC: 7.2 G/DL (ref 6–8.5)
RBC # BLD AUTO: 4.43 X10E6/UL (ref 3.77–5.28)
SODIUM SERPL-SCNC: 139 MMOL/L (ref 134–144)
TRIGL SERPL-MCNC: 87 MG/DL (ref 0–149)
VLDLC SERPL CALC-MCNC: 16 MG/DL (ref 5–40)
WBC # BLD AUTO: 10.1 X10E3/UL (ref 3.4–10.8)

## 2021-03-23 DIAGNOSIS — I10 HTN (HYPERTENSION), BENIGN: ICD-10-CM

## 2021-03-23 RX ORDER — AMLODIPINE BESYLATE 5 MG/1
5 TABLET ORAL DAILY
Qty: 90 TAB | Refills: 3 | Status: SHIPPED | OUTPATIENT
Start: 2021-03-23 | End: 2021-08-30 | Stop reason: SDUPTHER

## 2021-03-23 NOTE — TELEPHONE ENCOUNTER
Requested Prescriptions     Signed Prescriptions Disp Refills    amLODIPine (NORVASC) 5 mg tablet 90 Tab 3     Sig: Take 1 Tab by mouth daily.      Authorizing Provider: Antony Navarrete     Ordering User: Juliana Vail    Per Dr. Major John verbal orders

## 2021-03-24 DIAGNOSIS — I10 HTN (HYPERTENSION), BENIGN: ICD-10-CM

## 2021-03-24 RX ORDER — AMLODIPINE BESYLATE 5 MG/1
TABLET ORAL
Qty: 90 TAB | Refills: 3 | OUTPATIENT
Start: 2021-03-24

## 2021-06-03 ENCOUNTER — OFFICE VISIT (OUTPATIENT)
Dept: FAMILY MEDICINE CLINIC | Age: 86
End: 2021-06-03
Payer: MEDICARE

## 2021-06-03 VITALS
TEMPERATURE: 96.9 F | RESPIRATION RATE: 16 BRPM | SYSTOLIC BLOOD PRESSURE: 140 MMHG | HEART RATE: 84 BPM | HEIGHT: 64 IN | OXYGEN SATURATION: 95 % | WEIGHT: 165.38 LBS | DIASTOLIC BLOOD PRESSURE: 70 MMHG | BODY MASS INDEX: 28.24 KG/M2

## 2021-06-03 DIAGNOSIS — I10 HTN (HYPERTENSION), BENIGN: ICD-10-CM

## 2021-06-03 DIAGNOSIS — S80.02XA TRAUMATIC ECCHYMOSIS OF LEFT KNEE, INITIAL ENCOUNTER: ICD-10-CM

## 2021-06-03 DIAGNOSIS — M85.80 OSTEOPENIA WITH HIGH RISK OF FRACTURE: ICD-10-CM

## 2021-06-03 DIAGNOSIS — H91.90 HEARING LOSS, UNSPECIFIED HEARING LOSS TYPE, UNSPECIFIED LATERALITY: Primary | ICD-10-CM

## 2021-06-03 DIAGNOSIS — M25.562 ACUTE PAIN OF LEFT KNEE: ICD-10-CM

## 2021-06-03 PROCEDURE — 1090F PRES/ABSN URINE INCON ASSESS: CPT | Performed by: NURSE PRACTITIONER

## 2021-06-03 PROCEDURE — G8427 DOCREV CUR MEDS BY ELIG CLIN: HCPCS | Performed by: NURSE PRACTITIONER

## 2021-06-03 PROCEDURE — G8536 NO DOC ELDER MAL SCRN: HCPCS | Performed by: NURSE PRACTITIONER

## 2021-06-03 PROCEDURE — G8417 CALC BMI ABV UP PARAM F/U: HCPCS | Performed by: NURSE PRACTITIONER

## 2021-06-03 PROCEDURE — 1101F PT FALLS ASSESS-DOCD LE1/YR: CPT | Performed by: NURSE PRACTITIONER

## 2021-06-03 PROCEDURE — G8432 DEP SCR NOT DOC, RNG: HCPCS | Performed by: NURSE PRACTITIONER

## 2021-06-03 PROCEDURE — 99214 OFFICE O/P EST MOD 30 MIN: CPT | Performed by: NURSE PRACTITIONER

## 2021-06-03 RX ORDER — ALENDRONATE SODIUM 70 MG/1
70 TABLET ORAL
Qty: 12 TABLET | Refills: 3 | Status: SHIPPED | OUTPATIENT
Start: 2021-06-03 | End: 2021-08-30 | Stop reason: ALTCHOICE

## 2021-06-03 NOTE — PROGRESS NOTES
Subjective  Chief Complaint   Patient presents with    Other     wants to see ENT, check left knee     HPI:  Antony Eugene is a 80 y.o. female. Patient presents for ENT referral. She is also requesting to have her left knee checked. Also requesting refill of Fosamax. Has upcoming appointment with ENT for audiology exam to check hearing aides. Reports fall on Tuesday, landed on left knee. Initially left knee pain was sharp but is now tender and bruised. Ice and Tylenol are helping. Knee is painful with ambulation, using cane. Past Medical History:   Diagnosis Date    Arthritis     Asthma     Cancer ([place] [address]     squamous cell skin cancer    Chest pain, unspecified     Coagulation defects     bleeding as child due to lack of vitamin K - no problems now    GERD (gastroesophageal reflux disease)     Hearing loss     wears hearing aides    History of bone density study 01/26/2009    HTN (hypertension), benign     Mixed hyperlipidemia     Nonspecific abnormal unspecified cardiovascular function study 12/20/2010     Family History   Problem Relation Age of Onset    Parkinsonism Sister     Parkinsonism Sister     Cancer Mother         lymphoma    Coronary Artery Disease Father     Depression Maternal Aunt     Diabetes Maternal Grandmother      Social History     Socioeconomic History    Marital status:      Spouse name: Not on file    Number of children: 0    Years of education: Not on file    Highest education level: Not on file   Occupational History    Not on file   Tobacco Use    Smoking status: Never Smoker    Smokeless tobacco: Never Used   Vaping Use    Vaping Use: Never used   Substance and Sexual Activity    Alcohol use:  Yes     Alcohol/week: 0.8 standard drinks     Types: 1 Glasses of wine per week     Comment: glass of wine every 1-2 weeks    Drug use: No    Sexual activity: Not on file   Other Topics Concern    Not on file   Social History Narrative    Not on file     Social Determinants of Health     Financial Resource Strain:     Difficulty of Paying Living Expenses:    Food Insecurity:     Worried About Running Out of Food in the Last Year:     [address] in the Last Year:    Transportation Needs:     Lack of Transportation (Medical):  Lack of Transportation (Non-Medical):    Physical Activity:     Days of Exercise per Week:     Minutes of Exercise per Session:    Stress:     Feeling of Stress :    Social Connections:     Frequency of Communication with Friends and Family:     Frequency of Social Gatherings with Friends and Family:     Attends [employer] Services:     Active Member of Clubs or Organizations:     Attends Club or Organization Meetings:     Marital Status:    Intimate Partner Violence:     Fear of Current or Ex-Partner:     Emotionally Abused:     Physically Abused:     Sexually Abused:      Current Outpatient Medications on File Prior to Visit   Medication Sig Dispense Refill    amLODIPine (NORVASC) 5 mg tablet Take 1 Tab by mouth daily. 90 Tab 3    estradioL (ESTRACE) 0.01 % (0.1 mg/gram) vaginal cream       fluticasone propion-salmeteroL (ADVAIR/WIXELA) 500-50 mcg/dose diskus inhaler       fluticasone propionate (FLONASE) 50 mcg/actuation nasal spray 2 Sprays by Both Nostrils route daily.  chlorthalidone (HYGROTEN) 25 mg tablet Take 1 Tab by mouth daily. 90 Tab 3    atorvastatin (LIPITOR) 40 mg tablet Take 1 Tab by mouth daily. 90 Tab 3    calcium-cholecalciferol, d3, (CALCIUM 600 + D) 600-125 mg-unit tab Take 1 Tab by mouth two (2) times a day.  montelukast (SINGULAIR) 10 mg tablet Take 10 mg by mouth daily.  albuterol (Proventil HFA) 90 mcg/actuation inhaler Take 2 Puffs by inhalation every six (6) hours as needed.  omeprazole (PRILOSEC) 40 mg capsule Take 40 mg by mouth daily.  multivitamins-minerals-lutein (CENTRUM SILVER) Tab Take 1 Tab by mouth daily.       GLUCOSAMINE HCL/CHONDRO GARCIA A (GLUCOSAMINE-CHONDROITIN PO) Take  by mouth two (2) times a day.  [DISCONTINUED] alendronate (FOSAMAX) 70 mg tablet Take 70 mg by mouth every seven (7) days. No current facility-administered medications on file prior to visit. Allergies   Allergen Reactions    Amoxicillin Rash    Ketotifen Other (comments)     Redness of eye, irritation.  Lisinopril Cough    Losartan Other (comments)    Wheat Other (comments)     ROS  See HPI for pertinent ROS. Objective  Visit Vitals  BP (!) 140/70   Pulse 84   Temp 96.9 °F (36.1 °C) (Temporal)   Resp 16   Ht 5' 4\" (1.626 m)   Wt 165 lb 6 oz (75 kg)   SpO2 95%   BMI 28.39 kg/m²       Physical Exam  Vitals and nursing note reviewed. Constitutional:       General: She is not in acute distress. Appearance: Normal appearance. She is overweight. HENT:      Head: Normocephalic. Eyes:      Extraocular Movements: Extraocular movements intact. Cardiovascular:      Rate and Rhythm: Normal rate and regular rhythm. Heart sounds: Normal heart sounds. Pulmonary:      Effort: Pulmonary effort is normal.      Breath sounds: Normal breath sounds. Musculoskeletal:      Left knee: Swelling and ecchymosis present. No deformity, erythema or bony tenderness. Decreased range of motion (flexion). Tenderness (generalized) present. Right lower leg: No edema. Left lower leg: No edema. Skin:     General: Skin is warm and dry. Neurological:      Mental Status: She is alert and oriented to person, place, and time. Psychiatric:         Mood and Affect: Mood normal.         Behavior: Behavior normal.          Assessment & Plan      ICD-10-CM ICD-9-CM    1. Hearing loss, unspecified hearing loss type, unspecified laterality  H91.90 389.9 REFERRAL TO ENT-OTOLARYNGOLOGY   2. Acute pain of left knee  M25.562 719.46    3. Traumatic ecchymosis of left knee, initial encounter  S80. 02XA 924.11    4.  Osteopenia with high risk of fracture  M85.80 733.90 alendronate (FOSAMAX) 70 mg tablet   5. HTN (hypertension), benign  I10 401.1      Diagnoses and all orders for this visit:    1. Hearing loss, unspecified hearing loss type, unspecified laterality  ENT referral as requested. -     REFERRAL TO ENT-OTOLARYNGOLOGY    2. Acute pain of left knee  I would expect this to continue to improve slowly with conservative measures including rest, ice, and elevation. Declines x-ray to assess alignment today but will call if she feels this is needed in the future. Call as well if symptoms persist.    3. Traumatic ecchymosis of left knee, initial encounter  As above. Advised ecchymosis may worsen before it improves. 4. Osteopenia with high risk of fracture  Medication refilled as requested. -     alendronate (FOSAMAX) 70 mg tablet; Take 1 Tablet by mouth every seven (7) days. 5. HTN (hypertension), benign  BP above goal on intake and below goal on recheck. Follow-up and Dispositions    · Return in about 9 months (around 3/3/2022) for MCW, fasting labs, follow up, chronic conditions/meds.            Jasmyn Allen, MIKE

## 2021-06-04 DIAGNOSIS — E78.2 MIXED HYPERLIPIDEMIA: ICD-10-CM

## 2021-06-04 DIAGNOSIS — R07.9 CHEST PAIN, UNSPECIFIED: ICD-10-CM

## 2021-06-04 RX ORDER — ATORVASTATIN CALCIUM 40 MG/1
40 TABLET, FILM COATED ORAL
Qty: 90 TABLET | Refills: 1 | Status: SHIPPED | OUTPATIENT
Start: 2021-06-04 | End: 2021-08-30 | Stop reason: SDUPTHER

## 2021-06-04 NOTE — TELEPHONE ENCOUNTER
Requested Prescriptions     Signed Prescriptions Disp Refills    atorvastatin (LIPITOR) 40 mg tablet 90 Tablet 1     Sig: Take 1 Tablet by mouth nightly.      Authorizing Provider: Eliz Hinds     Ordering User: Jinny Navarrete    Per Dr. Fahad Pearce verbal orders

## 2021-08-27 DIAGNOSIS — R06.02 SOB (SHORTNESS OF BREATH): ICD-10-CM

## 2021-08-27 DIAGNOSIS — I10 HTN (HYPERTENSION), BENIGN: ICD-10-CM

## 2021-08-27 DIAGNOSIS — E78.2 MIXED HYPERLIPIDEMIA: ICD-10-CM

## 2021-08-27 RX ORDER — CHLORTHALIDONE 25 MG/1
25 TABLET ORAL DAILY
Qty: 90 TABLET | Refills: 0 | Status: SHIPPED | OUTPATIENT
Start: 2021-08-27 | End: 2021-08-30 | Stop reason: SDUPTHER

## 2021-08-27 NOTE — TELEPHONE ENCOUNTER
Patient states she is out of medication and has been for 2 days. Pharmacy confirmed.     PHONE: [phone]

## 2021-08-27 NOTE — TELEPHONE ENCOUNTER
Requested Prescriptions     Signed Prescriptions Disp Refills    chlorthalidone (HYGROTON) 25 mg tablet 90 Tablet 0     Sig: Take 1 Tablet by mouth daily.      Authorizing Provider: Vin Magana     Ordering User: Josesito Blevins    Per Dr. Ene Krueger verbal orders     Future Appointments   Date Time Provider Amanda Linares   8/30/2021 11:00 AM MD ARTURO Gay BS AMB   3/22/2022  8:30 AM Natalie Segovia NP [facility] BS AMB

## 2021-08-30 ENCOUNTER — OFFICE VISIT (OUTPATIENT)
Dept: CARDIOLOGY CLINIC | Age: 86
End: 2021-08-30
Payer: MEDICARE

## 2021-08-30 VITALS
SYSTOLIC BLOOD PRESSURE: 140 MMHG | WEIGHT: 162 LBS | BODY MASS INDEX: 27.66 KG/M2 | HEART RATE: 96 BPM | OXYGEN SATURATION: 98 % | RESPIRATION RATE: 15 BRPM | HEIGHT: 64 IN | DIASTOLIC BLOOD PRESSURE: 80 MMHG

## 2021-08-30 DIAGNOSIS — R06.02 SOB (SHORTNESS OF BREATH): ICD-10-CM

## 2021-08-30 DIAGNOSIS — E78.2 MIXED HYPERLIPIDEMIA: ICD-10-CM

## 2021-08-30 DIAGNOSIS — I10 HTN (HYPERTENSION), BENIGN: Primary | ICD-10-CM

## 2021-08-30 DIAGNOSIS — R07.9 CHEST PAIN, UNSPECIFIED: ICD-10-CM

## 2021-08-30 PROCEDURE — G8536 NO DOC ELDER MAL SCRN: HCPCS | Performed by: SPECIALIST

## 2021-08-30 PROCEDURE — 1101F PT FALLS ASSESS-DOCD LE1/YR: CPT | Performed by: SPECIALIST

## 2021-08-30 PROCEDURE — G8417 CALC BMI ABV UP PARAM F/U: HCPCS | Performed by: SPECIALIST

## 2021-08-30 PROCEDURE — 99213 OFFICE O/P EST LOW 20 MIN: CPT | Performed by: SPECIALIST

## 2021-08-30 PROCEDURE — G8510 SCR DEP NEG, NO PLAN REQD: HCPCS | Performed by: SPECIALIST

## 2021-08-30 PROCEDURE — G8427 DOCREV CUR MEDS BY ELIG CLIN: HCPCS | Performed by: SPECIALIST

## 2021-08-30 PROCEDURE — 1090F PRES/ABSN URINE INCON ASSESS: CPT | Performed by: SPECIALIST

## 2021-08-30 PROCEDURE — G0463 HOSPITAL OUTPT CLINIC VISIT: HCPCS | Performed by: SPECIALIST

## 2021-08-30 RX ORDER — AMLODIPINE BESYLATE 5 MG/1
5 TABLET ORAL DAILY
Qty: 90 TABLET | Refills: 3 | Status: SHIPPED | OUTPATIENT
Start: 2021-08-30 | End: 2022-09-26

## 2021-08-30 RX ORDER — ATORVASTATIN CALCIUM 40 MG/1
40 TABLET, FILM COATED ORAL
Qty: 90 TABLET | Refills: 3 | Status: SHIPPED | OUTPATIENT
Start: 2021-08-30 | End: 2022-08-08

## 2021-08-30 RX ORDER — AZELASTINE HYDROCHLORIDE AND FLUTICASONE PROPIONATE 137; 50 UG/1; UG/1
SPRAY, METERED NASAL
COMMUNITY
Start: 2021-08-18 | End: 2022-08-29 | Stop reason: ALTCHOICE

## 2021-08-30 RX ORDER — CHLORTHALIDONE 25 MG/1
25 TABLET ORAL DAILY
Qty: 90 TABLET | Refills: 3 | Status: SHIPPED | OUTPATIENT
Start: 2021-08-30 | End: 2022-08-29 | Stop reason: SDUPTHER

## 2021-08-30 RX ORDER — FLUTICASONE FUROATE AND VILANTEROL TRIFENATATE 200; 25 UG/1; UG/1
POWDER RESPIRATORY (INHALATION)
COMMUNITY
Start: 2021-07-14

## 2021-08-30 NOTE — PROGRESS NOTES
HISTORY OF PRESENT ILLNESS  Monika Blackwell is a 80 y.o. female     SUMMARY:   Problem List  Date Reviewed: 8/30/2021        Codes Class Noted    Overweight ICD-10-CM: E66.3  ICD-9-CM: 278.02  Unknown        Arthritis ICD-10-CM: M19.90  ICD-9-CM: 716.90  Unknown        GERD (gastroesophageal reflux disease) ICD-10-CM: K21.9  ICD-9-CM: 530.81  Unknown        Arthritis of lumbar spine ICD-10-CM: M47.816  ICD-9-CM: 721.3  Unknown        Severe persistent asthma ICD-10-CM: J45.50  ICD-9-CM: 493.90  Unknown        Celiac disease ICD-10-CM: K90.0  ICD-9-CM: 579.0  Unknown        Hypolipoproteinemia ICD-10-CM: E78.6  ICD-9-CM: 272.5  Unknown        Osteopenia with high risk of fracture ICD-10-CM: M85.80  ICD-9-CM: 733.90  Unknown        Nonspecific abnormal results of cardiovascular function study ICD-10-CM: R94.30  ICD-9-CM: 794.30  12/20/2010    Overview Signed 1/5/2011 11:33 AM by Pro Pope MD     Ms. Bello Putnam was evaluated for atypical chest pain and an abnormal stress test in April 2008. She underwent cardiac catheterization, which showed good LV function and minimal coronary disease             Mixed hyperlipidemia ICD-10-CM: E78.2  ICD-9-CM: 272.2  12/20/2010        HTN (hypertension), benign ICD-10-CM: I10  ICD-9-CM: 401.1  12/20/2010              Current Outpatient Medications on File Prior to Visit   Medication Sig    Breo Ellipta 200-25 mcg/dose inhaler     Dymista 137-50 mcg/spray spry     chlorthalidone (HYGROTON) 25 mg tablet Take 1 Tablet by mouth daily.  atorvastatin (LIPITOR) 40 mg tablet Take 1 Tablet by mouth nightly.  amLODIPine (NORVASC) 5 mg tablet Take 1 Tab by mouth daily.  estradioL (ESTRACE) 0.01 % (0.1 mg/gram) vaginal cream     calcium-cholecalciferol, d3, (CALCIUM 600 + D) 600-125 mg-unit tab Take 1 Tab by mouth two (2) times a day.  montelukast (SINGULAIR) 10 mg tablet Take 10 mg by mouth daily.     albuterol (Proventil HFA) 90 mcg/actuation inhaler Take 2 Puffs by inhalation every six (6) hours as needed.  omeprazole (PRILOSEC) 40 mg capsule Take 40 mg by mouth daily.  multivitamins-minerals-lutein (CENTRUM SILVER) Tab Take 1 Tab by mouth daily.  GLUCOSAMINE HCL/CHONDRO GARCIA A (GLUCOSAMINE-CHONDROITIN PO) Take  by mouth two (2) times a day.  fluticasone propion-salmeteroL (ADVAIR/WIXELA) 500-50 mcg/dose diskus inhaler     fluticasone propionate (FLONASE) 50 mcg/actuation nasal spray 2 Sprays by Both Nostrils route daily. No current facility-administered medications on file prior to visit. CARDIOLOGY STUDIES TO DATE:  3/27/08. The type of test was a treadmill - Standard Rufino Protocol with echocardiographic imaging. Exercise tolerance was fair. Cardiac stress testing was negative for chest pain and myocardial ischemia. Stress echocardiogram images showed normal resting LV wall motion and ischemia involving the inferior wall  Subsequent cath showed minimal cad    Chief Complaint   Patient presents with    Follow-up     HPI :  Overall she is doing quite well. Her blood pressure is borderline today which is not unusual for her in the office but she brought in readings from home which are good for the most part and she had an occasional systolic in the 056 range which was associated with some lightheadedness. She has intermittent dependent edema and shortness of breath related to her asthma. Her brother-in-law  unexpectedly when he was passed fishing on the [facility] about 3 weeks ago so there is been a lot of stress recently.   CARDIAC ROS:   negative for chest pain, palpitations, syncope, orthopnea, claudication    Family History   Problem Relation Age of Onset   [facility] Parkinsonism Sister     Parkinsonism Sister     Cancer Mother         lymphoma    Coronary Artery Disease Father     Depression Maternal Aunt     Diabetes Maternal Grandmother        Past Medical History:   Diagnosis Date    Arthritis     Asthma     Cancer ([facility] [address] squamous cell skin cancer    Chest pain, unspecified     Coagulation defects     bleeding as child due to lack of vitamin K - no problems now    GERD (gastroesophageal reflux disease)     Hearing loss     wears hearing aides    History of bone density study 01/26/2009    HTN (hypertension), benign     Mixed hyperlipidemia     Nonspecific abnormal unspecified cardiovascular function study 12/20/2010       GENERAL ROS:  A comprehensive review of systems was negative except for that written in the HPI.     Visit Vitals  BP (!) 140/80   Pulse 96   Resp 15   Ht 5' 4\" (1.626 m)   Wt 162 lb (73.5 kg)   SpO2 98%   BMI 27.81 kg/m²       Wt Readings from Last 3 Encounters:   08/30/21 162 lb (73.5 kg)   06/03/21 165 lb 6 oz (75 kg)   03/18/21 163 lb 6 oz (74.1 kg)            BP Readings from Last 3 Encounters:   08/30/21 (!) 140/80   06/03/21 (!) 140/70   03/18/21 (!) 140/80       PHYSICAL EXAM  General appearance: alert, cooperative, no distress, appears stated age  Neurologic: Alert and oriented X 3  Neck: supple, symmetrical, trachea midline, no adenopathy, no carotid bruit and no JVD  Lungs: clear to auscultation bilaterally  Heart: regular rate and rhythm, S1, S2 normal, no murmur, click, rub or gallop  Extremities: extremities normal, atraumatic, no cyanosis or edema    Lab Results   Component Value Date/Time    Cholesterol, total 160 03/18/2021 09:18 AM    Cholesterol, total 136 01/19/2015 12:00 AM    Cholesterol, total 175 01/20/2014 09:08 AM    Cholesterol, total 141 12/28/2012 09:34 AM    HDL Cholesterol 39 (L) 03/18/2021 09:18 AM    HDL Cholesterol 29 (L) 01/19/2015 12:00 AM    HDL Cholesterol 41 01/20/2014 09:08 AM    HDL Cholesterol 28 (L) 12/28/2012 09:34 AM    LDL, calculated 105 (H) 03/18/2021 09:18 AM    LDL, calculated 84 01/19/2015 12:00 AM    LDL, calculated 117 (H) 01/20/2014 09:08 AM    LDL, calculated 83 12/28/2012 09:34 AM    Triglyceride 87 03/18/2021 09:18 AM    Triglyceride 114 01/19/2015 12:00 AM    Triglyceride 87 01/20/2014 09:08 AM    Triglyceride 150 (H) 12/28/2012 09:34 AM     ASSESSMENT :      She is stable and I think asymptomatic at this point, well compensated on a good medical regimen and needs no cardiac testing. current treatment plan is effective, no change in therapy  lab results and schedule of future lab studies reviewed with patient  reviewed diet, exercise and weight control    Encounter Diagnoses   Name Primary?  HTN (hypertension), benign Yes    Mixed hyperlipidemia     SOB (shortness of breath)      Orders Placed This Encounter    Breo Ellipta 200-25 mcg/dose inhaler    Dymista 137-50 mcg/spray spry       Follow-up and Dispositions    · Return in about 1 year (around 8/30/2022). Nahed Balderas MD  8/30/2021  Please note that this dictation was completed with [email or web address], the computer voice recognition software. Quite often unanticipated grammatical, syntax, homophones, and other interpretive errors are inadvertently transcribed by the computer software. Please disregard these errors. Please excuse any errors that have escaped final proofreading. Thank you.

## 2021-10-28 ENCOUNTER — TELEPHONE (OUTPATIENT)
Dept: FAMILY MEDICINE CLINIC | Age: 86
End: 2021-10-28

## 2021-12-09 ENCOUNTER — TELEPHONE (OUTPATIENT)
Dept: FAMILY MEDICINE CLINIC | Age: 86
End: 2021-12-09

## 2021-12-09 NOTE — TELEPHONE ENCOUNTER
----- Message from Anisa Gutierrez sent at 12/8/2021 12:59 PM EST -----  Subject: Appointment Request    Reason for Call: Urgent (Patient Request) No Script    QUESTIONS  Type of Appointment? Established Patient  Reason for appointment request? Available appointments did not meet   patient need  Additional Information for Provider? Patient has lt side pain. She would   like to be seen asap. Nothing available until 12/17. Screened red. Please   call to schedule her  ---------------------------------------------------------------------------  --------------  CALL BACK INFO  What is the best way for the office to contact you? OK to leave message on   voicemail  Preferred Call Back Phone Number? [phone]  ---------------------------------------------------------------------------  --------------  SCRIPT ANSWERS  Relationship to Patient? Self  (Is the patient requesting to see the provider for a procedure?)? No  (Is the patient requesting to see the provider urgently  today or   tomorrow. )? Yes  Have you been diagnosed with, awaiting test results for, or told that you   are suspected of having COVID-19 (Coronavirus)? (If patient has tested   negative or was tested as a requirement for work, school, or travel and   not based on symptoms, answer no)? No  Within the past two weeks have you developed any of the following symptoms   (answer no if symptoms have been present longer than 2 weeks or began   more than 2 weeks ago)? Fever or Chills, Cough, Shortness of breath or   difficulty breathing, Loss of taste or smell, Sore throat, Nasal   congestion, Sneezing or runny nose, Fatigue or generalized body aches   (answer no if pain is specific to a body part e.g. back pain), Diarrhea,   Headache?  Yes

## 2021-12-09 NOTE — TELEPHONE ENCOUNTER
12/9/21-called pt back she stated that she found her back brace, has started to wear that and the pain has went away. Advised the pt to go to UC if she needed to be seen.

## 2022-03-16 DIAGNOSIS — M85.80 OSTEOPENIA WITH HIGH RISK OF FRACTURE: ICD-10-CM

## 2022-03-16 RX ORDER — ALENDRONATE SODIUM 70 MG/1
TABLET ORAL
Qty: 12 TABLET | Refills: 3 | Status: SHIPPED | OUTPATIENT
Start: 2022-03-16

## 2022-03-16 NOTE — TELEPHONE ENCOUNTER
Patient states that she doesn't know anything about anyone discontinuing the medication.  She states she has been taking it and would like the refill

## 2022-04-22 ENCOUNTER — OFFICE VISIT (OUTPATIENT)
Dept: FAMILY MEDICINE CLINIC | Age: 87
End: 2022-04-22
Payer: MEDICARE

## 2022-04-22 VITALS
WEIGHT: 155.38 LBS | SYSTOLIC BLOOD PRESSURE: 140 MMHG | HEART RATE: 70 BPM | HEIGHT: 64 IN | TEMPERATURE: 97.2 F | RESPIRATION RATE: 16 BRPM | OXYGEN SATURATION: 97 % | DIASTOLIC BLOOD PRESSURE: 80 MMHG | BODY MASS INDEX: 26.53 KG/M2

## 2022-04-22 DIAGNOSIS — Z00.00 MEDICARE ANNUAL WELLNESS VISIT, SUBSEQUENT: Primary | ICD-10-CM

## 2022-04-22 DIAGNOSIS — Z78.0 ASYMPTOMATIC POSTMENOPAUSAL STATE: ICD-10-CM

## 2022-04-22 DIAGNOSIS — K21.9 GASTROESOPHAGEAL REFLUX DISEASE, UNSPECIFIED WHETHER ESOPHAGITIS PRESENT: ICD-10-CM

## 2022-04-22 DIAGNOSIS — E53.8 DISORDER OF VITAMIN B12: ICD-10-CM

## 2022-04-22 DIAGNOSIS — I10 HTN (HYPERTENSION), BENIGN: ICD-10-CM

## 2022-04-22 DIAGNOSIS — Z91.81 AT LOW RISK FOR FALL: ICD-10-CM

## 2022-04-22 DIAGNOSIS — Z13.31 DEPRESSION SCREENING: ICD-10-CM

## 2022-04-22 DIAGNOSIS — E66.3 OVERWEIGHT WITH BODY MASS INDEX (BMI) OF 26 TO 26.9 IN ADULT: ICD-10-CM

## 2022-04-22 DIAGNOSIS — E78.2 MIXED HYPERLIPIDEMIA: ICD-10-CM

## 2022-04-22 DIAGNOSIS — J45.50 SEVERE PERSISTENT ASTHMA WITHOUT COMPLICATION: ICD-10-CM

## 2022-04-22 DIAGNOSIS — Z12.31 ENCOUNTER FOR SCREENING MAMMOGRAM FOR MALIGNANT NEOPLASM OF BREAST: ICD-10-CM

## 2022-04-22 DIAGNOSIS — Z23 ENCOUNTER FOR IMMUNIZATION: ICD-10-CM

## 2022-04-22 DIAGNOSIS — M85.80 OSTEOPENIA WITH HIGH RISK OF FRACTURE: ICD-10-CM

## 2022-04-22 DIAGNOSIS — Z13.39 ALCOHOL SCREENING: ICD-10-CM

## 2022-04-22 PROCEDURE — G8536 NO DOC ELDER MAL SCRN: HCPCS | Performed by: NURSE PRACTITIONER

## 2022-04-22 PROCEDURE — G8427 DOCREV CUR MEDS BY ELIG CLIN: HCPCS | Performed by: NURSE PRACTITIONER

## 2022-04-22 PROCEDURE — G8419 CALC BMI OUT NRM PARAM NOF/U: HCPCS | Performed by: NURSE PRACTITIONER

## 2022-04-22 PROCEDURE — 1101F PT FALLS ASSESS-DOCD LE1/YR: CPT | Performed by: NURSE PRACTITIONER

## 2022-04-22 PROCEDURE — G8432 DEP SCR NOT DOC, RNG: HCPCS | Performed by: NURSE PRACTITIONER

## 2022-04-22 PROCEDURE — G0439 PPPS, SUBSEQ VISIT: HCPCS | Performed by: NURSE PRACTITIONER

## 2022-04-22 PROCEDURE — 99214 OFFICE O/P EST MOD 30 MIN: CPT | Performed by: NURSE PRACTITIONER

## 2022-04-22 PROCEDURE — 1090F PRES/ABSN URINE INCON ASSESS: CPT | Performed by: NURSE PRACTITIONER

## 2022-04-22 RX ORDER — MIRABEGRON 50 MG/1
TABLET, FILM COATED, EXTENDED RELEASE ORAL
COMMUNITY
Start: 2022-04-19

## 2022-04-22 NOTE — PROGRESS NOTES
Medicare Wellness Exam:    Chief Complaint   Patient presents with    Annual Wellness Visit     she is a 80y.o. year old female who presents for evaluation for their Medicare Wellness Visit. Patient presents for Medicare wellness, fasting labs, and management of osteopenia with high fracture risk. Medications reviewed, taking as prescribed with no known side effects. Attributes weight loss to improved diet since son has started eating healthier and returning to regular exercise at [facility]. Follows with pulmonary for management of asthma and allergies. Follows with GI for management of GERD. Follows with urology for management of Estradiol cream.   Follows with cardiology for management of HTN and hyperlipidemia. Requesting to have labs checked today and sent to Dr. Keshav White. Reported home BP readings 130-140/80s. Fall Screen is completed and assessed=yes  Depression Screen is completed and assessed=yes  Medication list reviewed and adjusted for accuracy=yes  Immunizations reviewed and updated=yes  Health/Preventative Screenings reviewed and updated=yes  ADL Functions reviewed=yes  MiniCog score= 4 /5 (2points for clock, 3/3 for recall)  See scanned medicare wellness documents for full details. Patient Active Problem List    Diagnosis    Overweight    Arthritis    GERD (gastroesophageal reflux disease)    Arthritis of lumbar spine    Severe persistent asthma    Celiac disease    Hypolipoproteinemia    Osteopenia with high risk of fracture    Nonspecific abnormal results of cardiovascular function study     Ms. Kimmie Burkett was evaluated for atypical chest pain and an abnormal stress test in April 2008. She underwent cardiac catheterization, which showed good LV function and minimal coronary disease      Mixed hyperlipidemia    HTN (hypertension), benign       Reviewed PmHx, RxHx, FmHx, SocHx, AllgHx and updated and dated in the chart.     Review of Systems   Constitutional: Negative for chills, fever and weight loss. HENT: Positive for congestion (allergies) and hearing loss. Negative for ear pain, sinus pain and sore throat. Denies difficulty swallowing. Eyes: Negative for blurred vision. Respiratory: Positive for cough (attributed to asthma, allergies, and reflux). Negative for shortness of breath and wheezing. Cardiovascular: Negative for chest pain, palpitations and leg swelling. Gastrointestinal: Negative for abdominal pain, constipation, diarrhea and heartburn. Genitourinary: Negative for dysuria. Musculoskeletal: Positive for joint pain. Negative for myalgias. Neurological: Positive for weakness (with too much activity). Negative for dizziness, tingling and headaches. Psychiatric/Behavioral: Negative for depression. The patient is not nervous/anxious. Objective:     Vitals:    04/22/22 1009 04/22/22 1211   BP: (!) 158/78 (!) 140/80   Pulse: 70    Resp: 16    Temp: 97.2 °F (36.2 °C)    TempSrc: Temporal    SpO2: 97%    Weight: 155 lb 6 oz (70.5 kg)    Height: 5' 4\" (1.626 m)      Physical Exam  Vitals and nursing note reviewed. Constitutional:       General: She is not in acute distress. Appearance: Normal appearance. She is overweight. HENT:      Head: Normocephalic. Eyes:      Extraocular Movements: Extraocular movements intact. Neck:      Thyroid: No thyroid mass, thyromegaly or thyroid tenderness. Cardiovascular:      Rate and Rhythm: Normal rate and regular rhythm. Heart sounds: Normal heart sounds. Pulmonary:      Effort: Pulmonary effort is normal.      Breath sounds: Normal breath sounds. Chest:   Breasts:      Right: No supraclavicular adenopathy. Left: No supraclavicular adenopathy. Musculoskeletal:         General: Normal range of motion. Cervical back: Neck supple. Right lower leg: No edema. Left lower leg: No edema. Lymphadenopathy:      Cervical: No cervical adenopathy.       Upper Body: Right upper body: No supraclavicular adenopathy. Left upper body: No supraclavicular adenopathy. Skin:     General: Skin is warm and dry. Neurological:      Mental Status: She is alert and oriented to person, place, and time. Psychiatric:         Mood and Affect: Mood normal.         Behavior: Behavior normal.          Assessment/ Plan:   Diagnoses and all orders for this visit:    1. Medicare annual wellness visit, subsequent  [facility] exam completed as documented. 2. Depression screening  Negative depression screening. 3. Alcohol screening  Low risk for alcohol misuse. 4. At low risk for fall  Low fall risk. 5. Overweight with body mass index (BMI) of 26 to 26.9 in adult  Weight is down approximately 7 pounds with positive dietary changes. Increase exercise as tolerated, limit portions, and continue to eat a healthy diet. 6. Encounter for immunization  Schedule to receive his first dose of Shingrix today from pharmacy. 7. Osteopenia with high risk of fracture  Due for 2-year repeat screening this summer. Continue Fosamax, calcium, and vitamin D daily. Increase weightbearing exercise. 8. Encounter for screening mammogram for malignant neoplasm of breast  -     MANINDER MAMMO BI SCREENING INCL CAD; Future    9. Asymptomatic postmenopausal state  -     DEXA BONE DENSITY STUDY AXIAL; Future    10. Severe persistent asthma without complication  Follows with pulmonary for management. Asymptomatic at this time. 11. Gastroesophageal reflux disease, unspecified whether esophagitis present  Follows with GI for management. Symptoms are well controlled at this time. B12 normal when checked 3/15/2019.  -     MAGNESIUM  -     VITAMIN B12    12. Disorder of vitamin B12  Potential malabsorption due to daily PPI use. -     VITAMIN B12    13. HTN (hypertension), benign  Follows with cardiology for management. Checking labs today at patient request for upcoming cardiology visit.   Blood pressure is below goal in the office today and on reported readings per JNC 8 guidelines. -     METABOLIC PANEL, COMPREHENSIVE    14. Mixed hyperlipidemia  Follows with cardiology for management. -     CBC WITH AUTOMATED DIFF  -     LIPID PANEL         -Pain evaluation performed in office  -Cognitive Screen performed in office  -Depression Screen, Fall risks (by up and go test)  and ADL functionality were addressed  -Medication list updated and reviewed for any changes   -A comprehensive review of medical issues and a plan was formulated  -End of life planning was addressed with pt   -Health Screenings for preventions were addressed and a plan was formulated  -Shingles Vaccine was recommended  -Discussed with patient cancer risk factors and appropriate screenings for age  -Patient evaluated for colonoscopy and referred if needed per screeing criteria  -Labs from previous visits were discussed with patient   -Discussed with patient diet and exercise and formulated a plan as needed  -An Advanced care plan was developed with the patient.  -Alcohol screening performed and was negative    -  Follow-up and Dispositions    · Return in about 1 year (around 4/22/2023) for [address], fasting labs, follow up, chronic conditions/meds. I have discussed the diagnosis with the patient and the intended plan as seen in the above orders. The patient understands and agrees with the plan. The patient has received an after-visit summary and questions were answered concerning future plans.      Medication Side Effects and Warnings were discussed with patient  Patient Labs were reviewed and or requested  Patient Past Records were reviewed and or requested        Soha KUMAR

## 2022-04-22 NOTE — PROGRESS NOTES
Chief Complaint   Patient presents with   [facility] Annual Wellness Visit   1. Have you been to the ER, urgent care clinic since your last visit? Hospitalized since your last visit? No    2. Have you seen or consulted any other health care providers outside of the [address] [place] [address] since your last visit? Include any pap smears or colon screening.  Yes Reason for visit: urology 04/2022, dermatology   3 most recent [facility] 4/22/2022   Little interest or pleasure in doing things Not at all   Feeling down, depressed, irritable, or hopeless Not at all   Total Score PHQ 2 0     Visit Vitals  BP (!) 158/78 (BP 1 Location: Left upper arm, BP Patient Position: Sitting)   Pulse 70   Temp 97.2 °F (36.2 °C) (Temporal)   Resp 16   Ht 5' 4\" (1.626 m)   Wt 155 lb 6 oz (70.5 kg)   SpO2 97%   BMI 26.67 kg/m²

## 2022-04-22 NOTE — PATIENT INSTRUCTIONS
The best way to stay healthy is to live a healthy lifestyle. A healthy lifestyle includes regular exercise, eating a well-balanced diet, keeping a healthy weight and not smoking. Regular physical exams and screening tests are another important way to take care of yourself. Preventive exams provided by health care providers can find health problems early when treatment works best and can keep you from getting certain diseases or illnesses. Preventive services include exams, lab tests, screenings, shots, monitoring and information to help you take care of your own health. All people over 65 should have a pneumonia shot. Pneumonia shots are usually only needed once in a lifetime unless your doctor decides differently. In addition to your physical exam, some screening tests are recommended:    All people over 65 should have a yearly flu shot. People over 65 are at medium to high risk for Hepatitis B. Three shots are needed for complete protection. Bone mass measurement (dexa scan) is recommended every two years. Diabetes Mellitus screening is recommended every year. Glaucoma is an eye disease caused by high pressure in the eye. An eye exam is recommended every year. Cardiovascular screening tests that check your cholesterol and other blood fat (lipid) levels are recommended every five years. Colorectal Cancer screening tests help to find pre-cancerous polyps (growths in the colon) so they can be removed before they turn into cancer. Tests ordered for screening depend on your personal and family history risk factors. Prostate Cancer Screening (annually up to age 76)    Screening for breast cancer is recommended yearly with a Mammogram.    Screening for cervical and vaginal cancer is recommended with a pelvic and Pap test every two years.  However if you have had an abnormal pap in the past  three years or at high risk for cervical or vaginal cancer Medicare will cover a pap test and a pelvic exam every year.      Here is a list of your current Health Maintenance items with a due date:  Health Maintenance Due   Topic Date Due    Shingles Vaccine (1 of 2) Never done    DTaP/Tdap/Td  (1 - Tdap) 01/28/2020    Cholesterol Test   03/18/2022    Annual Well Visit  03/19/2022

## 2022-04-23 LAB
ALBUMIN SERPL-MCNC: 4.3 G/DL (ref 3.6–4.6)
ALBUMIN/GLOB SERPL: 1.7 {RATIO} (ref 1.2–2.2)
ALP SERPL-CCNC: 58 IU/L (ref 44–121)
ALT SERPL-CCNC: 29 IU/L (ref 0–32)
AST SERPL-CCNC: 31 IU/L (ref 0–40)
BASOPHILS # BLD AUTO: 0.1 X10E3/UL (ref 0–0.2)
BASOPHILS NFR BLD AUTO: 1 %
BILIRUB SERPL-MCNC: 0.7 MG/DL (ref 0–1.2)
BUN SERPL-MCNC: 22 MG/DL (ref 8–27)
BUN/CREAT SERPL: 30 (ref 12–28)
CALCIUM SERPL-MCNC: 9.6 MG/DL (ref 8.7–10.3)
CHLORIDE SERPL-SCNC: 99 MMOL/L (ref 96–106)
CHOLEST SERPL-MCNC: 168 MG/DL (ref 100–199)
CO2 SERPL-SCNC: 24 MMOL/L (ref 20–29)
CREAT SERPL-MCNC: 0.73 MG/DL (ref 0.57–1)
EGFR: 80 ML/MIN/1.73
EOSINOPHIL # BLD AUTO: 0.1 X10E3/UL (ref 0–0.4)
EOSINOPHIL NFR BLD AUTO: 2 %
ERYTHROCYTE [DISTWIDTH] IN BLOOD BY AUTOMATED COUNT: 12.7 % (ref 11.7–15.4)
GLOBULIN SER CALC-MCNC: 2.6 G/DL (ref 1.5–4.5)
GLUCOSE SERPL-MCNC: 93 MG/DL (ref 65–99)
HCT VFR BLD AUTO: 41.2 % (ref 34–46.6)
HDLC SERPL-MCNC: 38 MG/DL
HGB BLD-MCNC: 13.8 G/DL (ref 11.1–15.9)
IMM GRANULOCYTES # BLD AUTO: 0 X10E3/UL (ref 0–0.1)
IMM GRANULOCYTES NFR BLD AUTO: 0 %
LDLC SERPL CALC-MCNC: 113 MG/DL (ref 0–99)
LYMPHOCYTES # BLD AUTO: 2.1 X10E3/UL (ref 0.7–3.1)
LYMPHOCYTES NFR BLD AUTO: 28 %
MAGNESIUM SERPL-MCNC: 1.8 MG/DL (ref 1.6–2.3)
MCH RBC QN AUTO: 31.9 PG (ref 26.6–33)
MCHC RBC AUTO-ENTMCNC: 33.5 G/DL (ref 31.5–35.7)
MCV RBC AUTO: 95 FL (ref 79–97)
MONOCYTES # BLD AUTO: 0.6 X10E3/UL (ref 0.1–0.9)
MONOCYTES NFR BLD AUTO: 8 %
NEUTROPHILS # BLD AUTO: 4.9 X10E3/UL (ref 1.4–7)
NEUTROPHILS NFR BLD AUTO: 61 %
PLATELET # BLD AUTO: 344 X10E3/UL (ref 150–450)
POTASSIUM SERPL-SCNC: 3.9 MMOL/L (ref 3.5–5.2)
PROT SERPL-MCNC: 6.9 G/DL (ref 6–8.5)
RBC # BLD AUTO: 4.33 X10E6/UL (ref 3.77–5.28)
SODIUM SERPL-SCNC: 142 MMOL/L (ref 134–144)
TRIGL SERPL-MCNC: 93 MG/DL (ref 0–149)
VIT B12 SERPL-MCNC: 777 PG/ML (ref 232–1245)
VLDLC SERPL CALC-MCNC: 17 MG/DL (ref 5–40)
WBC # BLD AUTO: 7.8 X10E3/UL (ref 3.4–10.8)

## 2022-06-09 DIAGNOSIS — Z12.31 ENCOUNTER FOR SCREENING MAMMOGRAM FOR MALIGNANT NEOPLASM OF BREAST: ICD-10-CM

## 2022-06-09 DIAGNOSIS — Z78.0 ASYMPTOMATIC POSTMENOPAUSAL STATE: ICD-10-CM

## 2022-06-13 ENCOUNTER — PATIENT MESSAGE (OUTPATIENT)
Dept: FAMILY MEDICINE CLINIC | Age: 87
End: 2022-06-13

## 2022-06-13 DIAGNOSIS — M54.50 CHRONIC LOW BACK PAIN, UNSPECIFIED BACK PAIN LATERALITY, UNSPECIFIED WHETHER SCIATICA PRESENT: Primary | ICD-10-CM

## 2022-06-13 DIAGNOSIS — G89.29 CHRONIC LOW BACK PAIN, UNSPECIFIED BACK PAIN LATERALITY, UNSPECIFIED WHETHER SCIATICA PRESENT: Primary | ICD-10-CM

## 2022-06-13 NOTE — TELEPHONE ENCOUNTER
From: Carito Camera  To: Jerry Maki NP  Sent: 6/13/2022 3:38 PM EDT  Subject: Concerning a request made at my last visit    When I was in the office last I asked about a new brace for my back which your office has supplied in the past . You were going to find out about it. Have you had a chance to do so.

## 2022-06-14 RX ORDER — ARM BRACE
EACH MISCELLANEOUS
Qty: 1 EACH | Refills: 0 | Status: SHIPPED | OUTPATIENT
Start: 2022-06-14

## 2022-07-27 ENCOUNTER — TELEPHONE (OUTPATIENT)
Dept: FAMILY MEDICINE CLINIC | Age: 87
End: 2022-07-27

## 2022-07-27 NOTE — TELEPHONE ENCOUNTER
Patient was given medication at [facility].  Patient advised to follow the quarantine guidelines

## 2022-08-08 DIAGNOSIS — E78.2 MIXED HYPERLIPIDEMIA: ICD-10-CM

## 2022-08-08 DIAGNOSIS — R07.9 CHEST PAIN, UNSPECIFIED: ICD-10-CM

## 2022-08-08 RX ORDER — ATORVASTATIN CALCIUM 40 MG/1
TABLET, FILM COATED ORAL
Qty: 90 TABLET | Refills: 3 | Status: SHIPPED | OUTPATIENT
Start: 2022-08-08

## 2022-08-08 NOTE — TELEPHONE ENCOUNTER
Requested Prescriptions     Signed Prescriptions Disp Refills    atorvastatin (LIPITOR) 40 mg tablet 90 Tablet 3     Sig: TAKE 1 TABLET BY MOUTH AT  NIGHT     Authorizing Provider: Yazmin Fish     Ordering User: Jamar Tirado    Per Dr. Mera Reyna verbal orders

## 2022-08-29 ENCOUNTER — OFFICE VISIT (OUTPATIENT)
Dept: CARDIOLOGY CLINIC | Age: 87
End: 2022-08-29
Payer: MEDICARE

## 2022-08-29 VITALS
SYSTOLIC BLOOD PRESSURE: 130 MMHG | OXYGEN SATURATION: 95 % | BODY MASS INDEX: 26.19 KG/M2 | DIASTOLIC BLOOD PRESSURE: 70 MMHG | WEIGHT: 153.4 LBS | HEIGHT: 64 IN | HEART RATE: 78 BPM | RESPIRATION RATE: 16 BRPM

## 2022-08-29 DIAGNOSIS — I10 HTN (HYPERTENSION), BENIGN: Primary | ICD-10-CM

## 2022-08-29 DIAGNOSIS — R06.02 SOB (SHORTNESS OF BREATH): ICD-10-CM

## 2022-08-29 DIAGNOSIS — E78.2 MIXED HYPERLIPIDEMIA: ICD-10-CM

## 2022-08-29 PROCEDURE — G8417 CALC BMI ABV UP PARAM F/U: HCPCS | Performed by: SPECIALIST

## 2022-08-29 PROCEDURE — 1123F ACP DISCUSS/DSCN MKR DOCD: CPT | Performed by: SPECIALIST

## 2022-08-29 PROCEDURE — G8536 NO DOC ELDER MAL SCRN: HCPCS | Performed by: SPECIALIST

## 2022-08-29 PROCEDURE — 1090F PRES/ABSN URINE INCON ASSESS: CPT | Performed by: SPECIALIST

## 2022-08-29 PROCEDURE — G8510 SCR DEP NEG, NO PLAN REQD: HCPCS | Performed by: SPECIALIST

## 2022-08-29 PROCEDURE — 99213 OFFICE O/P EST LOW 20 MIN: CPT | Performed by: SPECIALIST

## 2022-08-29 PROCEDURE — G8427 DOCREV CUR MEDS BY ELIG CLIN: HCPCS | Performed by: SPECIALIST

## 2022-08-29 PROCEDURE — 1101F PT FALLS ASSESS-DOCD LE1/YR: CPT | Performed by: SPECIALIST

## 2022-08-29 RX ORDER — FLUTICASONE FUROATE, UMECLIDINIUM BROMIDE AND VILANTEROL TRIFENATATE 200; 62.5; 25 UG/1; UG/1; UG/1
POWDER RESPIRATORY (INHALATION)
COMMUNITY
Start: 2022-07-25 | End: 2022-08-29 | Stop reason: ALTCHOICE

## 2022-08-29 RX ORDER — MELOXICAM 7.5 MG/1
TABLET ORAL
COMMUNITY
Start: 2022-08-17 | End: 2022-08-29 | Stop reason: ALTCHOICE

## 2022-08-29 RX ORDER — CHLORTHALIDONE 25 MG/1
25 TABLET ORAL DAILY
Qty: 90 TABLET | Refills: 3 | Status: SHIPPED | OUTPATIENT
Start: 2022-08-29

## 2022-08-29 NOTE — PROGRESS NOTES
HISTORY OF PRESENT ILLNESS  Cash Grey is a 80 y.o. female     SUMMARY:   Problem List  Date Reviewed: 8/29/2022            Codes Class Noted    Overweight ICD-10-CM: E66.3  ICD-9-CM: 278.02  Unknown        Arthritis ICD-10-CM: M19.90  ICD-9-CM: 716.90  Unknown        GERD (gastroesophageal reflux disease) ICD-10-CM: K21.9  ICD-9-CM: 530.81  Unknown        Arthritis of lumbar spine ICD-10-CM: M47.816  ICD-9-CM: 721.3  Unknown        Severe persistent asthma ICD-10-CM: J45.50  ICD-9-CM: 493.90  Unknown        Celiac disease ICD-10-CM: K90.0  ICD-9-CM: 579.0  Unknown        Hypolipoproteinemia ICD-10-CM: E78.6  ICD-9-CM: 272.5  Unknown        Osteopenia with high risk of fracture ICD-10-CM: M85.80  ICD-9-CM: 733.90  Unknown        Nonspecific abnormal results of cardiovascular function study ICD-10-CM: R94.30  ICD-9-CM: 794.30  12/20/2010    Overview Signed 1/5/2011 11:33 AM by Tamiko Mayo MD     Ms. Fatimah Terrell was evaluated for atypical chest pain and an abnormal stress test in April 2008. She underwent cardiac catheterization, which showed good LV function and minimal coronary disease             Mixed hyperlipidemia ICD-10-CM: E78.2  ICD-9-CM: 272.2  12/20/2010        HTN (hypertension), benign ICD-10-CM: I10  ICD-9-CM: 401.1  12/20/2010           Current Outpatient Medications on File Prior to Visit   Medication Sig    atorvastatin (LIPITOR) 40 mg tablet TAKE 1 TABLET BY MOUTH AT  NIGHT    Back Brace misc Wear daily. Myrbetriq 50 mg ER tablet     alendronate (FOSAMAX) 70 mg tablet TAKE 1 TABLET BY MOUTH  EVERY 7 DAYS    chlorthalidone (HYGROTON) 25 mg tablet Take 1 Tablet by mouth daily. amLODIPine (NORVASC) 5 mg tablet Take 1 Tablet by mouth daily. estradioL (ESTRACE) 0.01 % (0.1 mg/gram) vaginal cream     calcium-cholecalciferol, d3, 600-125 mg-unit tab Take 1 Tab by mouth two (2) times a day. montelukast (SINGULAIR) 10 mg tablet Take 10 mg by mouth daily.     albuterol (Proventil HFA) 90 mcg/actuation inhaler Take 2 Puffs by inhalation every six (6) hours as needed. omeprazole (PRILOSEC) 40 mg capsule Take 40 mg by mouth daily. multivitamins-minerals-lutein (MEN'S CENTRUM SILVER WITH LUTEIN) tab tablet Take 1 Tab by mouth daily. GLUCOSAMINE HCL/CHONDRO GARCIA A (GLUCOSAMINE-CHONDROITIN PO) Take  by mouth two (2) times a day. Breo Ellipta 200-25 mcg/dose inhaler      No current facility-administered medications on file prior to visit. CARDIOLOGY STUDIES TO DATE:  3/27/08. The type of test was a treadmill - Standard Ruf[place] Protocol with echocardiographic imaging. Exercise tolerance was fair. Cardiac stress testing was negative for chest pain and myocardial ischemia. Stress echocardiogram images showed normal resting LV wall motion and ischemia involving the inferior wall  Subsequent cath showed minimal cad    Chief Complaint   Patient presents with    Follow-up     Annual     HPI :  Both she and her  and their son had Mason Alvarado about a month ago and was sick for about 10 days but never had to go to the hospital and she feels pretty much back to normal except for some fatigue. She is active but not exercising. Blood pressure is well controlled and she is having no problems with her medications. Her primary care is following her lipids.   CARDIAC ROS:   negative for chest pain, dyspnea, palpitations, syncope, orthopnea, paroxysmal nocturnal dyspnea, exertional chest pressure/discomfort, claudication, lower extremity edema    Family History   Problem Relation Age of Onset    Parkinsonism Sister     Parkinsonism Sister     Cancer Mother         lymphoma    Coronary Art Dis Father     Depression Maternal Aunt     Diabetes Maternal Grandmother        Past Medical History:   Diagnosis Date    Arthritis     Asthma     Cancer ([facility] [address]     squamous cell skin cancer    Chest pain, unspecified     Coagulation defects     bleeding as child due to lack of vitamin K - no problems now    GERD (gastroesophageal reflux disease)     Hearing loss     wears hearing aides    History of bone density study 01/26/2009    HTN (hypertension), benign     Mixed hyperlipidemia     Nonspecific abnormal unspecified cardiovascular function study 12/20/2010       GENERAL ROS:  A comprehensive review of systems was negative except for that written in the HPI.     Visit Vitals  /70 (BP 1 Location: Left upper arm, BP Patient Position: Sitting, BP Cuff Size: Large adult)   Pulse 78   Resp 16   Ht 5' 4\" (1.626 m)   Wt 153 lb 6.4 oz (69.6 kg)   SpO2 95%   BMI 26.33 kg/m²       Wt Readings from Last 3 Encounters:   08/29/22 153 lb 6.4 oz (69.6 kg)   04/22/22 155 lb 6 oz (70.5 kg)   08/30/21 162 lb (73.5 kg)            BP Readings from Last 3 Encounters:   08/29/22 130/70   04/22/22 (!) 140/80   08/30/21 (!) 140/80       PHYSICAL EXAM  General appearance: alert, cooperative, no distress, appears stated age  Neurologic: Alert and oriented X 3  Neck: supple, symmetrical, trachea midline, no adenopathy, no carotid bruit, and no JVD  Lungs: clear to auscultation bilaterally  Heart: regular rate and rhythm, S1, S2 normal, no murmur, click, rub or gallop  Extremities: extremities normal, atraumatic, no cyanosis or edema    Lab Results   Component Value Date/Time    Cholesterol, total 168 04/22/2022 10:49 AM    Cholesterol, total 160 03/18/2021 09:18 AM    Cholesterol, total 136 01/19/2015 12:00 AM    Cholesterol, total 175 01/20/2014 09:08 AM    Cholesterol, total 141 12/28/2012 09:34 AM    HDL Cholesterol 38 (L) 04/22/2022 10:49 AM    HDL Cholesterol 39 (L) 03/18/2021 09:18 AM    HDL Cholesterol 29 (L) 01/19/2015 12:00 AM    HDL Cholesterol 41 01/20/2014 09:08 AM    HDL Cholesterol 28 (L) 12/28/2012 09:34 AM    LDL, calculated 113 (H) 04/22/2022 10:49 AM    LDL, calculated 105 (H) 03/18/2021 09:18 AM    LDL, calculated 84 01/19/2015 12:00 AM    LDL, calculated 117 (H) 01/20/2014 09:08 AM    LDL, calculated 83 12/28/2012 09:34 AM    Triglyceride 93 04/22/2022 10:49 AM    Triglyceride 87 03/18/2021 09:18 AM    Triglyceride 114 01/19/2015 12:00 AM    Triglyceride 87 01/20/2014 09:08 AM    Triglyceride 150 (H) 12/28/2012 09:34 AM     ASSESSMENT :      She is stable and asymptomatic from a cardiac perspective well compensated on a good medical regimen and needs no cardiac testing at this time. current treatment plan is effective, no change in therapy  lab results and schedule of future lab studies reviewed with patient  reviewed diet, exercise and weight control    Encounter Diagnoses   Name Primary? HTN (hypertension), benign Yes    Mixed hyperlipidemia     SOB (shortness of breath)      Orders Placed This Encounter    DISCONTD: fluticasone-umeclidin-vilanter (Trelegy Ellipta) 200-62.5-25 mcg inhaler    DISCONTD: meloxicam (MOBIC) 7.5 mg tablet       Follow-up and Dispositions    Return in about 1 year (around 8/29/2023). Mary Truong MD  8/29/2022  Please note that this dictation was completed with e[employer]ruction by [employer], the [employer] voice recognition software. Quite often unanticipated grammatical, syntax, homophones, and other interpretive errors are inadvertently transcribed by the computer software. Please disregard these errors. Please excuse any errors that have escaped final proofreading. Thank you.

## 2022-09-26 DIAGNOSIS — I10 HTN (HYPERTENSION), BENIGN: ICD-10-CM

## 2022-09-26 RX ORDER — AMLODIPINE BESYLATE 5 MG/1
5 TABLET ORAL DAILY
Qty: 90 TABLET | Refills: 1 | Status: SHIPPED | OUTPATIENT
Start: 2022-09-26

## 2022-09-26 RX ORDER — AMLODIPINE BESYLATE 5 MG/1
5 TABLET ORAL DAILY
Qty: 90 TABLET | Refills: 3 | Status: SHIPPED | OUTPATIENT
Start: 2022-09-26

## 2022-09-26 NOTE — TELEPHONE ENCOUNTER
Requested Prescriptions     Signed Prescriptions Disp Refills    amLODIPine (NORVASC) 5 mg tablet 90 Tablet 3     Sig: TAKE 1 TABLET BY MOUTH  DAILY     Authorizing Provider: Melissa Barba     Ordering User: Buddy Adair    Per Dr. Emery Cardoso verbal orders

## 2022-09-26 NOTE — TELEPHONE ENCOUNTER
Requested Prescriptions     Signed Prescriptions Disp Refills    amLODIPine (NORVASC) 5 mg tablet 90 Tablet 1     Sig: Take 1 Tablet by mouth daily.      Authorizing Provider: Darrick Morillo     Ordering User: Tory Vilchis    Per Dr. Sergo Dennison verbal orders

## 2022-09-26 NOTE — TELEPHONE ENCOUNTER
Patient is calling to request a refill for Amlodipine 5mg , pt is out of medication. Please Advise.      Pharmacy Verified     [phone]

## 2023-02-10 ENCOUNTER — PATIENT MESSAGE (OUTPATIENT)
Dept: FAMILY MEDICINE CLINIC | Age: 88
End: 2023-02-10

## 2023-02-13 NOTE — TELEPHONE ENCOUNTER
Spoke with patient and she states that she fell on Wednesday 2/8/23 and hit head and had a lump on head which has gone away now but still is tender. She did go to [facility] ER to get checked out but wait was long and left. Patient states she has not had any problems since fall and doesn't feel like she need to be seen for this. I told her that if she has any symptoms that she should be since at urgent care or the ER. I also notifiy her of her upcoming appt in April and that she could get her flu and covid vaccine at her pharmacy.

## 2023-02-13 NOTE — TELEPHONE ENCOUNTER
From: Bryanna Richmond  To: Eloisa De Santiago NP  Sent: 2/10/2023 6:03 PM EST  Subject: Appointment Request    Appointment Request From: Bryanna Richmond    With Provider: Eloisa De Santiago NP [[id number]    Preferred Date Range: Any date 2/13/2023 or later    Preferred Times: Any Time    Reason for visit: Routine/Problem Visit    Comments:  Balance, Flu Shot, 2nd Booster for [place], [employer], Fall (hit Head)

## 2023-03-26 DIAGNOSIS — M85.80 OSTEOPENIA WITH HIGH RISK OF FRACTURE: ICD-10-CM

## 2023-03-27 RX ORDER — ALENDRONATE SODIUM 70 MG/1
TABLET ORAL
Qty: 12 TABLET | Refills: 3 | Status: SHIPPED | OUTPATIENT
Start: 2023-03-27

## 2023-04-25 ENCOUNTER — OFFICE VISIT (OUTPATIENT)
Dept: FAMILY MEDICINE CLINIC | Age: 88
End: 2023-04-25
Payer: MEDICARE

## 2023-04-25 VITALS
HEIGHT: 64 IN | TEMPERATURE: 97.1 F | RESPIRATION RATE: 16 BRPM | HEART RATE: 78 BPM | WEIGHT: 154.38 LBS | DIASTOLIC BLOOD PRESSURE: 84 MMHG | BODY MASS INDEX: 26.36 KG/M2 | OXYGEN SATURATION: 97 % | SYSTOLIC BLOOD PRESSURE: 140 MMHG

## 2023-04-25 DIAGNOSIS — I10 HTN (HYPERTENSION), BENIGN: ICD-10-CM

## 2023-04-25 DIAGNOSIS — E78.2 MIXED HYPERLIPIDEMIA: ICD-10-CM

## 2023-04-25 DIAGNOSIS — M85.80 OSTEOPENIA WITH HIGH RISK OF FRACTURE: ICD-10-CM

## 2023-04-25 DIAGNOSIS — Z23 ENCOUNTER FOR IMMUNIZATION: ICD-10-CM

## 2023-04-25 DIAGNOSIS — G89.29 CHRONIC MIDLINE LOW BACK PAIN WITHOUT SCIATICA: ICD-10-CM

## 2023-04-25 DIAGNOSIS — R26.89 IMBALANCE: ICD-10-CM

## 2023-04-25 DIAGNOSIS — Z13.31 DEPRESSION SCREENING: ICD-10-CM

## 2023-04-25 DIAGNOSIS — M62.81 MUSCLE WEAKNESS: ICD-10-CM

## 2023-04-25 DIAGNOSIS — M54.50 CHRONIC MIDLINE LOW BACK PAIN WITHOUT SCIATICA: ICD-10-CM

## 2023-04-25 DIAGNOSIS — Z00.00 MEDICARE ANNUAL WELLNESS VISIT, SUBSEQUENT: Primary | ICD-10-CM

## 2023-04-25 DIAGNOSIS — Z13.39 ALCOHOL SCREENING: ICD-10-CM

## 2023-04-25 DIAGNOSIS — E66.3 OVERWEIGHT WITH BODY MASS INDEX (BMI) OF 26 TO 26.9 IN ADULT: ICD-10-CM

## 2023-04-25 DIAGNOSIS — Z91.81 AT HIGH RISK FOR FALLS: ICD-10-CM

## 2023-04-25 PROCEDURE — G8432 DEP SCR NOT DOC, RNG: HCPCS | Performed by: NURSE PRACTITIONER

## 2023-04-25 PROCEDURE — G8417 CALC BMI ABV UP PARAM F/U: HCPCS | Performed by: NURSE PRACTITIONER

## 2023-04-25 PROCEDURE — 99214 OFFICE O/P EST MOD 30 MIN: CPT | Performed by: NURSE PRACTITIONER

## 2023-04-25 PROCEDURE — G0439 PPPS, SUBSEQ VISIT: HCPCS | Performed by: NURSE PRACTITIONER

## 2023-04-25 PROCEDURE — 1101F PT FALLS ASSESS-DOCD LE1/YR: CPT | Performed by: NURSE PRACTITIONER

## 2023-04-25 PROCEDURE — 1123F ACP DISCUSS/DSCN MKR DOCD: CPT | Performed by: NURSE PRACTITIONER

## 2023-04-25 PROCEDURE — 1090F PRES/ABSN URINE INCON ASSESS: CPT | Performed by: NURSE PRACTITIONER

## 2023-04-25 PROCEDURE — G8536 NO DOC ELDER MAL SCRN: HCPCS | Performed by: NURSE PRACTITIONER

## 2023-04-25 PROCEDURE — G8427 DOCREV CUR MEDS BY ELIG CLIN: HCPCS | Performed by: NURSE PRACTITIONER

## 2023-04-25 RX ORDER — ARM BRACE
EACH MISCELLANEOUS
Qty: 1 EACH | Refills: 0 | Status: SHIPPED | OUTPATIENT
Start: 2023-04-25

## 2023-04-25 RX ORDER — MELOXICAM 15 MG/1
15 TABLET ORAL DAILY
COMMUNITY

## 2023-04-25 NOTE — PROGRESS NOTES
Medicare Wellness Exam:    Chief Complaint   Patient presents with    Annual Wellness Visit     she is a 80y.o. year old female who presents for evaluation for their Medicare Wellness Visit. Patient presents for Medicare wellness, fasting labs, and management of osteopenia with high fracture risk. Medications reviewed, taking as prescribed with no known side effects. Follows with pulmonary for management of asthma and allergies- Albuterol, Montelukast, and Trelegy. Follows with GI for management of GERD- Omeprazole. Follows with urology for management of Estradiol cream and Myrbetriq. Follows with cardiology for management of HTN and hyperlipidemia- Amlodipine, Atorvastatin, Chlorthalidone. Requesting to have labs checked today and sent to Dr. Susie Mendes. BP log reviewed, readings are labile 130-150s/70-80s. Reports several falls during the [place] season due to gifts and boxes in her way. Attributes falls also to making frequent turns. Using a cane for ambulation especially at night. Recently finished PT for back and shoulder. Also requesting order for a new lumbar sacral back brace. She has seen spine surgeon in the past for this and she is not a candidate for surgery. She has completed PT for this and continues home exercises daily. Pain is managed by wearing back brace daily. Fall Screen is completed and assessed=yes  Depression Screen is completed and assessed=yes  Medication list reviewed and adjusted for accuracy=yes  Immunizations reviewed and updated=yes  Health/Preventative Screenings reviewed and updated=yes  ADL Functions reviewed=yes  MiniCog score= 4/5 (2points for clock, 3/3 for recall)  See scanned medicare wellness documents for full details.      Patient Active Problem List    Diagnosis    Overweight    Arthritis    GERD (gastroesophageal reflux disease)    Arthritis of lumbar spine    Severe persistent asthma    Celiac disease    Hypolipoproteinemia    Osteopenia with high risk of fracture    Nonspecific abnormal results of cardiovascular function study     Ms. Aliyah Calderon was evaluated for atypical chest pain and an abnormal stress test in April 2008. She underwent cardiac catheterization, which showed good LV function and minimal coronary disease      Mixed hyperlipidemia    HTN (hypertension), benign       Reviewed PmHx, RxHx, FmHx, SocHx, AllgHx and updated and dated in the chart. Objective:     Vitals:    04/25/23 0850   BP: (!) 140/84   Pulse: 78   Resp: 16   Temp: 97.1 °F (36.2 °C)   TempSrc: Temporal   SpO2: 97%   Weight: 154 lb 6 oz (70 kg)   Height: 5' 4\" (1.626 m)     Physical Exam  Vitals and nursing note reviewed. Constitutional:       General: She is not in acute distress. Appearance: Normal appearance. She is overweight. HENT:      Head: Normocephalic. Eyes:      Extraocular Movements: Extraocular movements intact. Neck:      Thyroid: No thyroid mass, thyromegaly or thyroid tenderness. Cardiovascular:      Rate and Rhythm: Normal rate and regular rhythm. Heart sounds: Normal heart sounds. Pulmonary:      Effort: Pulmonary effort is normal.      Breath sounds: Normal breath sounds. Musculoskeletal:         General: Normal range of motion. Cervical back: Neck supple. Right lower leg: No edema. Left lower leg: No edema. Lymphadenopathy:      Cervical: No cervical adenopathy. Upper Body:      Right upper body: No supraclavicular adenopathy. Left upper body: No supraclavicular adenopathy. Skin:     General: Skin is warm and dry. Neurological:      Mental Status: She is alert and oriented to person, place, and time. Psychiatric:         Mood and Affect: Mood normal.         Behavior: Behavior normal.        Assessment/ Plan:   Diagnoses and all orders for this visit:    1. Medicare annual wellness visit, subsequent  [facility] exam completed as documented. 2. Depression screening  Negative depression screening.     3. Alcohol screening  Low risk for alcohol misuse. 4. At high risk for falls  PT for further evaluation and treatment.   -     REFERRAL TO PHYSICAL THERAPY    5. Imbalance  -     REFERRAL TO PHYSICAL THERAPY    6. Muscle weakness  -     REFERRAL TO PHYSICAL THERAPY    7. Overweight with body mass index (BMI) of 26 to 26.9 in adult  Continue to stay active and eat a healthy diet. 8. Encounter for immunization  Receive COVID booster from pharmacy. 9. Osteopenia with high risk of fracture  Continue Fosamax weekly. 10. HTN (hypertension), benign  Follows with cardiology for management. Checking labs today at patient request.  BP is below goal in the office today per JNC 8 guidelines. Home BP readings are labile. Encouraged to call cardiology if BP readings.  -     METABOLIC PANEL, COMPREHENSIVE    11. Mixed hyperlipidemia  Follows with cardiology for management. Checking labs today as patient request.  Continue atorvastatin daily  -     CBC WITH AUTOMATED DIFF  -     LIPID PANEL    12. Chronic midline low back pain without sciatica  Back brace as ordered. This is working well to control her pain and assist with daily activities. -     Back Brace misc;  Wear daily.         -Pain evaluation performed in office  -Cognitive Screen performed in office  -Depression Screen, Fall risks (by up and go test)  and ADL functionality were addressed  -Medication list updated and reviewed for any changes   -A comprehensive review of medical issues and a plan was formulated  -End of life planning was addressed with pt   -Health Screenings for preventions were addressed and a plan was formulated  -Shingles Vaccine was recommended  -Discussed with patient cancer risk factors and appropriate screenings for age  -Patient evaluated for colonoscopy and referred if needed per screeing criteria  -Labs from previous visits were discussed with patient   -Discussed with patient diet and exercise and formulated a plan as needed  -An Advanced care plan was developed with the patient.  -Alcohol screening performed and was negative    -  Follow-up and Dispositions    Return in about 1 year (around 4/25/2024) for [facility], ?fasting labs, follow up, chronic conditions/meds. I have discussed the diagnosis with the patient and the intended plan as seen in the above orders. The patient understands and agrees with the plan. The patient has received an after-visit summary and questions were answered concerning future plans. Medication Side Effects and Warnings were discussed with patient  Patient Labs were reviewed and or requested  Patient Past Records were reviewed and or requested    Aspects of this note may have been generated using voice recognition software. Despite editing, there may be some syntax errors.     David QUINTANAC

## 2023-04-25 NOTE — PROGRESS NOTES
No chief complaint on file. 1. \"Have you been to the ER, urgent care clinic since your last visit? Hospitalized since your last visit? \" No    2. \"Have you seen or consulted any other health care providers outside of the [address] [place] [address] since your last visit? \" No     3. For patients aged 39-70: Has the patient had a colonoscopy / FIT/ Cologuard? NA - based on age      If the patient is female:    4. For patients aged 41-77: Has the patient had a mammogram within the past 2 years? NA - based on age or sex      11. For patients aged 21-65: Has the patient had a pap smear?  NA - based on age or sex Visit Vitals  BP (!) 140/84   Pulse 78   Temp 97.1 °F (36.2 °C) (Temporal)   Resp 16   Ht 5' 4\" (1.626 m)   Wt 154 lb 6 oz (70 kg)   SpO2 97%   BMI 26.50 kg/m²      3 most recent PHQ Screens 4/25/2023   Little interest or pleasure in doing things Not at all   Feeling down, depressed, irritable, or hopeless Not at all   Total Score PHQ 2 0

## 2023-04-26 LAB
ALBUMIN SERPL-MCNC: 4.4 G/DL (ref 3.6–4.6)
ALBUMIN/GLOB SERPL: 1.7 {RATIO} (ref 1.2–2.2)
ALP SERPL-CCNC: 68 IU/L (ref 44–121)
ALT SERPL-CCNC: 21 IU/L (ref 0–32)
AST SERPL-CCNC: 25 IU/L (ref 0–40)
BASOPHILS # BLD AUTO: 0.1 X10E3/UL (ref 0–0.2)
BASOPHILS NFR BLD AUTO: 1 %
BILIRUB SERPL-MCNC: 0.6 MG/DL (ref 0–1.2)
BUN SERPL-MCNC: 17 MG/DL (ref 8–27)
BUN/CREAT SERPL: 21 (ref 12–28)
CALCIUM SERPL-MCNC: 9.3 MG/DL (ref 8.7–10.3)
CHLORIDE SERPL-SCNC: 97 MMOL/L (ref 96–106)
CHOLEST SERPL-MCNC: 158 MG/DL (ref 100–199)
CO2 SERPL-SCNC: 27 MMOL/L (ref 20–29)
CREAT SERPL-MCNC: 0.82 MG/DL (ref 0.57–1)
EGFRCR SERPLBLD CKD-EPI 2021: 69 ML/MIN/1.73
EOSINOPHIL # BLD AUTO: 0.2 X10E3/UL (ref 0–0.4)
EOSINOPHIL NFR BLD AUTO: 2 %
ERYTHROCYTE [DISTWIDTH] IN BLOOD BY AUTOMATED COUNT: 12.5 % (ref 11.7–15.4)
GLOBULIN SER CALC-MCNC: 2.6 G/DL (ref 1.5–4.5)
GLUCOSE SERPL-MCNC: 95 MG/DL (ref 70–99)
HCT VFR BLD AUTO: 40.3 % (ref 34–46.6)
HDLC SERPL-MCNC: 42 MG/DL
HGB BLD-MCNC: 14 G/DL (ref 11.1–15.9)
IMM GRANULOCYTES # BLD AUTO: 0 X10E3/UL (ref 0–0.1)
IMM GRANULOCYTES NFR BLD AUTO: 0 %
LDLC SERPL CALC-MCNC: 101 MG/DL (ref 0–99)
LYMPHOCYTES # BLD AUTO: 2 X10E3/UL (ref 0.7–3.1)
LYMPHOCYTES NFR BLD AUTO: 24 %
MCH RBC QN AUTO: 32.7 PG (ref 26.6–33)
MCHC RBC AUTO-ENTMCNC: 34.7 G/DL (ref 31.5–35.7)
MCV RBC AUTO: 94 FL (ref 79–97)
MONOCYTES # BLD AUTO: 0.7 X10E3/UL (ref 0.1–0.9)
MONOCYTES NFR BLD AUTO: 8 %
NEUTROPHILS # BLD AUTO: 5.7 X10E3/UL (ref 1.4–7)
NEUTROPHILS NFR BLD AUTO: 65 %
PLATELET # BLD AUTO: 342 X10E3/UL (ref 150–450)
POTASSIUM SERPL-SCNC: 3.5 MMOL/L (ref 3.5–5.2)
PROT SERPL-MCNC: 7 G/DL (ref 6–8.5)
RBC # BLD AUTO: 4.28 X10E6/UL (ref 3.77–5.28)
SODIUM SERPL-SCNC: 136 MMOL/L (ref 134–144)
TRIGL SERPL-MCNC: 78 MG/DL (ref 0–149)
VLDLC SERPL CALC-MCNC: 15 MG/DL (ref 5–40)
WBC # BLD AUTO: 8.7 X10E3/UL (ref 3.4–10.8)

## 2023-05-03 ENCOUNTER — ANESTHESIA EVENT (OUTPATIENT)
Dept: ENDOSCOPY | Age: 88
End: 2023-05-03
Payer: MEDICARE

## 2023-05-03 ENCOUNTER — ANESTHESIA (OUTPATIENT)
Dept: ENDOSCOPY | Age: 88
End: 2023-05-03
Payer: MEDICARE

## 2023-05-03 ENCOUNTER — HOSPITAL ENCOUNTER (OUTPATIENT)
Age: 88
Setting detail: OUTPATIENT SURGERY
Discharge: HOME | End: 2023-05-03
Attending: INTERNAL MEDICINE | Admitting: INTERNAL MEDICINE
Payer: MEDICARE

## 2023-05-03 VITALS
HEART RATE: 63 BPM | OXYGEN SATURATION: 93 % | DIASTOLIC BLOOD PRESSURE: 69 MMHG | TEMPERATURE: 98.9 F | BODY MASS INDEX: 26.8 KG/M2 | SYSTOLIC BLOOD PRESSURE: 144 MMHG | WEIGHT: 157 LBS | HEIGHT: 64 IN | RESPIRATION RATE: 22 BRPM

## 2023-05-03 PROCEDURE — 76060000031 HC ANESTHESIA FIRST 0.5 HR: Performed by: INTERNAL MEDICINE

## 2023-05-03 PROCEDURE — 2709999900 HC NON-CHARGEABLE SUPPLY: Performed by: INTERNAL MEDICINE

## 2023-05-03 PROCEDURE — 74011000250 HC RX REV CODE- 250: Performed by: NURSE ANESTHETIST, CERTIFIED REGISTERED

## 2023-05-03 PROCEDURE — C1726 CATH, BAL DIL, NON-VASCULAR: HCPCS | Performed by: INTERNAL MEDICINE

## 2023-05-03 PROCEDURE — 76040000019: Performed by: INTERNAL MEDICINE

## 2023-05-03 PROCEDURE — 74011250636 HC RX REV CODE- 250/636: Performed by: NURSE ANESTHETIST, CERTIFIED REGISTERED

## 2023-05-03 RX ORDER — SODIUM CHLORIDE 9 MG/ML
INJECTION, SOLUTION INTRAVENOUS
Status: DISCONTINUED | OUTPATIENT
Start: 2023-05-03 | End: 2023-05-03 | Stop reason: HOSPADM

## 2023-05-03 RX ORDER — DEXTROMETHORPHAN/PSEUDOEPHED 2.5-7.5/.8
1.2 DROPS ORAL
Status: DISCONTINUED | OUTPATIENT
Start: 2023-05-03 | End: 2023-05-03 | Stop reason: HOSPADM

## 2023-05-03 RX ORDER — SODIUM CHLORIDE 0.9 % (FLUSH) 0.9 %
5-40 SYRINGE (ML) INJECTION EVERY 8 HOURS
Status: DISCONTINUED | OUTPATIENT
Start: 2023-05-03 | End: 2023-05-03 | Stop reason: HOSPADM

## 2023-05-03 RX ORDER — PROPOFOL 10 MG/ML
INJECTION, EMULSION INTRAVENOUS AS NEEDED
Status: DISCONTINUED | OUTPATIENT
Start: 2023-05-03 | End: 2023-05-03 | Stop reason: HOSPADM

## 2023-05-03 RX ORDER — FLUMAZENIL 0.1 MG/ML
0.2 INJECTION INTRAVENOUS
Status: DISCONTINUED | OUTPATIENT
Start: 2023-05-03 | End: 2023-05-03 | Stop reason: HOSPADM

## 2023-05-03 RX ORDER — SODIUM CHLORIDE 9 MG/ML
50 INJECTION, SOLUTION INTRAVENOUS CONTINUOUS
Status: DISCONTINUED | OUTPATIENT
Start: 2023-05-03 | End: 2023-05-03 | Stop reason: HOSPADM

## 2023-05-03 RX ORDER — EPINEPHRINE 0.1 MG/ML
1 INJECTION INTRACARDIAC; INTRAVENOUS
Status: DISCONTINUED | OUTPATIENT
Start: 2023-05-03 | End: 2023-05-03 | Stop reason: HOSPADM

## 2023-05-03 RX ORDER — SODIUM CHLORIDE 0.9 % (FLUSH) 0.9 %
5-40 SYRINGE (ML) INJECTION AS NEEDED
Status: DISCONTINUED | OUTPATIENT
Start: 2023-05-03 | End: 2023-05-03 | Stop reason: HOSPADM

## 2023-05-03 RX ORDER — LIDOCAINE HYDROCHLORIDE 20 MG/ML
INJECTION, SOLUTION EPIDURAL; INFILTRATION; INTRACAUDAL; PERINEURAL AS NEEDED
Status: DISCONTINUED | OUTPATIENT
Start: 2023-05-03 | End: 2023-05-03 | Stop reason: HOSPADM

## 2023-05-03 RX ORDER — ATROPINE SULFATE 0.1 MG/ML
0.5 INJECTION INTRAVENOUS
Status: DISCONTINUED | OUTPATIENT
Start: 2023-05-03 | End: 2023-05-03 | Stop reason: HOSPADM

## 2023-05-03 RX ORDER — NALOXONE HYDROCHLORIDE 0.4 MG/ML
0.4 INJECTION, SOLUTION INTRAMUSCULAR; INTRAVENOUS; SUBCUTANEOUS
Status: DISCONTINUED | OUTPATIENT
Start: 2023-05-03 | End: 2023-05-03 | Stop reason: HOSPADM

## 2023-05-03 RX ADMIN — SODIUM CHLORIDE: 900 INJECTION, SOLUTION INTRAVENOUS at 10:34

## 2023-05-03 RX ADMIN — PROPOFOL 50 MG: 10 INJECTION, EMULSION INTRAVENOUS at 10:49

## 2023-05-03 RX ADMIN — PROPOFOL 40 MG: 10 INJECTION, EMULSION INTRAVENOUS at 10:50

## 2023-05-03 RX ADMIN — LIDOCAINE HYDROCHLORIDE 40 MG: 20 INJECTION, SOLUTION EPIDURAL; INFILTRATION; INTRACAUDAL; PERINEURAL at 10:49

## 2023-05-19 ENCOUNTER — TELEPHONE (OUTPATIENT)
Facility: CLINIC | Age: 88
End: 2023-05-19

## 2023-05-19 DIAGNOSIS — R26.89 POOR BALANCE: ICD-10-CM

## 2023-05-19 DIAGNOSIS — Z91.81 AT HIGH RISK FOR FALLS: Primary | ICD-10-CM

## 2023-05-19 DIAGNOSIS — R53.1 GENERALIZED WEAKNESS: ICD-10-CM

## 2023-08-03 ENCOUNTER — HOSPITAL ENCOUNTER (OUTPATIENT)
Facility: HOSPITAL | Age: 88
Discharge: HOME | End: 2023-08-03
Attending: INTERNAL MEDICINE
Payer: MEDICARE

## 2023-08-03 DIAGNOSIS — R93.89 ABNORMAL CXR: ICD-10-CM

## 2023-08-03 PROCEDURE — 71250 CT THORAX DX C-: CPT

## 2023-08-30 ENCOUNTER — OFFICE VISIT (OUTPATIENT)
Age: 88
End: 2023-08-30
Payer: MEDICARE

## 2023-08-30 VITALS
HEART RATE: 72 BPM | SYSTOLIC BLOOD PRESSURE: 178 MMHG | OXYGEN SATURATION: 98 % | BODY MASS INDEX: 26.8 KG/M2 | DIASTOLIC BLOOD PRESSURE: 82 MMHG | HEIGHT: 64 IN | WEIGHT: 157 LBS

## 2023-08-30 DIAGNOSIS — E78.2 MIXED HYPERLIPIDEMIA: ICD-10-CM

## 2023-08-30 DIAGNOSIS — R06.02 SHORTNESS OF BREATH: ICD-10-CM

## 2023-08-30 DIAGNOSIS — I10 HTN (HYPERTENSION), BENIGN: Primary | ICD-10-CM

## 2023-08-30 PROCEDURE — 99213 OFFICE O/P EST LOW 20 MIN: CPT | Performed by: SPECIALIST

## 2023-08-30 RX ORDER — ATORVASTATIN CALCIUM 40 MG/1
TABLET, FILM COATED ORAL
Qty: 90 TABLET | Refills: 3 | Status: SHIPPED | OUTPATIENT
Start: 2023-08-30

## 2023-08-30 RX ORDER — UBIDECARENONE 30 MG
1 CAPSULE ORAL
COMMUNITY
Start: 2010-12-20

## 2023-08-30 RX ORDER — AZELASTINE HYDROCHLORIDE, FLUTICASONE PROPIONATE 137; 50 UG/1; UG/1
SPRAY, METERED NASAL
COMMUNITY
Start: 2022-06-15

## 2023-08-30 ASSESSMENT — PATIENT HEALTH QUESTIONNAIRE - PHQ9
SUM OF ALL RESPONSES TO PHQ9 QUESTIONS 1 & 2: 0
SUM OF ALL RESPONSES TO PHQ QUESTIONS 1-9: 0
SUM OF ALL RESPONSES TO PHQ QUESTIONS 1-9: 0
2. FEELING DOWN, DEPRESSED OR HOPELESS: 0
SUM OF ALL RESPONSES TO PHQ QUESTIONS 1-9: 0
1. LITTLE INTEREST OR PLEASURE IN DOING THINGS: 0
SUM OF ALL RESPONSES TO PHQ QUESTIONS 1-9: 0

## 2023-08-30 NOTE — PROGRESS NOTES
HISTORY OF PRESENT ILLNESS  Briana Tapia is a 80 y.o. female     SUMMARY:   Patient Active Problem List   Diagnosis    HTN (hypertension), benign    Overweight    Nonspecific abnormal results of cardiovascular function study    Osteopenia with high risk of fracture    Arthritis    GERD (gastroesophageal reflux disease)    Hypolipoproteinemia    Severe persistent asthma    Arthritis of lumbar spine    Mixed hyperlipidemia    Celiac disease            CARDIOLOGY STUDIES TO DATE:  3/27/08. The type of test was a treadmill - Standard Jovani Protocol with echocardiographic imaging. Exercise tolerance was fair. Cardiac stress testing was negative for chest pain and myocardial ischemia. Stress echocardiogram images showed normal resting LV wall motion and ischemia involving the inferior wall  Subsequent cath showed minimal cad    Chief Complaint   Patient presents with    Hypertension       HPI :  She is doing great with no cardiac complaints or problems with her medications. She still has some shortness of breath from time to time which responds to her inhaler and she has been going through some balance classes to try to prevent falls. Blood pressure was up in her primary care's office and initially here today but she brought in a bunch of readings from home and they all look good. Still chronic back issues. A while back she had a persistent cough and she ended up getting a CT scan of the chest which looked okay except she is got a very large goiter and is scheduled to see the endocrinologist.  Recent lipid profile looked good for her.     CARDIAC ROS:   negative for chest pain, claudication, irregular heart beat, lower extremity edema, orthopnea, paroxysmal nocturnal dyspnea, and syncope    Family History   Problem Relation Age of Onset    Depression Maternal Aunt     Coronary Art Dis Father     Cancer Mother         lymphoma    Parkinsonism Sister     Parkinsonism Sister     Diabetes Maternal

## 2023-08-30 NOTE — PROGRESS NOTES
Dr. Aureliano Pope said to continue current medication despite elevated bp. The patient will keep a week bp log and call us with the numbers.

## 2023-08-30 NOTE — TELEPHONE ENCOUNTER
Requested Prescriptions     Signed Prescriptions Disp Refills    atorvastatin (LIPITOR) 40 MG tablet 90 tablet 3     Sig: TAKE 1 TABLET BY MOUTH AT  NIGHT     Authorizing Provider: Kenny Sanchez     Ordering User:  Jeanne coronel MD    Future Appointments   Date Time Provider [address]   4/25/2024  9:00 AM MARCUS Baeza NP [facility]   9/6/2024 10:20 AM MD HÉCTOR Arredondo BS AMB

## 2023-09-06 ENCOUNTER — TELEPHONE (OUTPATIENT)
Facility: CLINIC | Age: 88
End: 2023-09-06

## 2023-09-06 DIAGNOSIS — J45.50 SEVERE PERSISTENT ASTHMA WITHOUT COMPLICATION: ICD-10-CM

## 2023-09-06 DIAGNOSIS — R54 ADVANCED AGE: ICD-10-CM

## 2023-09-06 DIAGNOSIS — U07.1 COVID-19 VIRUS INFECTION: Primary | ICD-10-CM

## 2023-09-06 NOTE — TELEPHONE ENCOUNTER
Pt called in regards to wanting to let PCP team know that she tested positive for Covid this morning 09/06/2023. Pt was just advised to contact PCP office.  Does not know if there is anything special instructions on how to go about the illness

## 2023-09-06 NOTE — TELEPHONE ENCOUNTER
Pt stated that she is having some fatigue, headache, congestion and cough. She stated that she is taking OTC medications but stills feels congested.

## 2023-09-11 ENCOUNTER — TELEPHONE (OUTPATIENT)
Age: 88
End: 2023-09-11

## 2023-09-11 RX ORDER — CHLORTHALIDONE 25 MG/1
25 TABLET ORAL DAILY
Qty: 90 TABLET | Refills: 3 | Status: SHIPPED | OUTPATIENT
Start: 2023-09-11

## 2023-09-11 NOTE — TELEPHONE ENCOUNTER
Requested Prescriptions     Signed Prescriptions Disp Refills    chlorthalidone (HYGROTON) 25 MG tablet 90 tablet 3     Sig: TAKE 1 TABLET BY MOUTH  DAILY     Authorizing Provider: Neptali Shi     Ordering User: Ada Miner   Per Dr. Genevieve Davila verbal order.

## 2023-09-11 NOTE — TELEPHONE ENCOUNTER
[facility] met with pt and completed her psychosocial assessment and ITP. Pt is known to this [facility] from previous [facility] admissions. Pt discussed her recent increase in depression and anxiety, leading her to have suicidal thoughts. Pt shares that she and her boyfriend of almost a year (Niles) are staying in a hotel temporarily until they move to [place], [place], where Niles will be working for his uncle at his bike shop. Pt reports both of them have had difficulty getting and maintaining employment due to COVID. COVID also affected pt's ability to access aftercare services, so has been off of her medications since April/May of 2020. Pt discussed being unsettled by learning that the man whom she was told was her dad all of her life, is not her biological dad. Pt tells that her mom shared this information with pt a couple of months ago when pt was having health concerns, and she felt that pt should know that her dad had cancer and  at the age of 44. Pt reports that she wants to get back on her medications and have her mood stabilized during her hospitalization. For further information, please see the attached assessment. Lynn Dooley, MSW, CISW     10/26/20 1000   Psychosocial Addendum to Intake   Intake Assessment has been Reviewed Yes   Support Systems Spouse/Significant other;Friends/neighbors   Family / Social Assessment   Does Patient Have Family or Social Issues Yes   Describe Patient's Childhood Pt reports being born in [place], but moving to [place] until the age of 3 where she was primarily raised by her grandfather. Pt reports returning to [place] following this and growing up in [place].  Pt reports initially growing up with her mom, dad, older half brother, and older sister. Pt tells that her dad was a  and had a drinking problem.  Pt reports that he would be out on the road and cheat on pt's mom and then come home and \"smack her around\".  Pt tells that her parents always fought and used  Pt. Calling with her BP readings    09/04 - 136/64  09/05 - 141/54  09/06 - 124/55  09/07 - 144/66  09/10 - 139/60  09/11 - 141/63    Wants to discuss.     [phone] [employer] and went on to school at [employer] that they are planning to move to [place], [place], together, where Niles can work for his uncle's bike shop. Pt tells that her 3 year old son remains with his father in [place]. Pt tells that they usually meet in [place] on Saturdays so pt gets to see him.   Describe Patient's Relationships with Peers and/or Social Environment Pt shares that she has a friends that she talks to via Face Book (not in person due to COVID).    Describe Patient's Leisure Activities, Hobbies/Interests Pt enjoys spending time with her son and with her boyfriend Niles.   Source of Information for Psychosocial Assessment Intake Assessment;Patient;Other  (Previous contacts/records from prior [facility] hospitalizations.)   Abuse Evaluation   Violence/Abuse Screen Complete assessment (alone or age 12 years or less with parents)   In the past, have you ever been physically hurt, threatened, controlled or made to feel afraid by someone close to you? Yes   Currently, are you in a relationship where you are being physically hurt, threatened, controlled or made to feel afraid? No   Current Abuse Toward You No   Current Abuse by You Toward Others No   History of Adult Abuse Yes   Comments Prior to pt's last [facility] admission (Feb. of 2020), pt's mom and mom's  had lashed out at pt physically and verbally. Pt also reports being in abusive relationships as an adult. Pt tells that her current boyfriend is not abusive towards her.    History of Childhood Abuse Yes   Comments Pt reports being the victim of physical, verbal, and emotional abuse from her \"dad\" (who pt believed was her biological dad up until recently) growing up. Pt also reports witnessing her \"dad\" being abusive towards pt's mother. Pt reports that both of her parents used AODA and her childhood was abusive and chaotic.   Trauma Assessment   In your life, have you ever had any experience that was so frightening, horrible, or upsetting that you thought about it in the past month? Yes   Post-Traumatic  Stress Disorder Screen   Have you had nightmares or thought about it when you didn't want to? No   Tried hard not to think about it or thought about it when you did not want to? Yes   Were constantly on guard, watchful, or easily startled? No   Felt numb or detached from others, activities, or your surroundings? No   Comments Pt reports that a couple of months ago, pt was not feeling well. Pt said that her mom disclosed to pt at that time that her biological dad had cancer and  shortly after pt was conceived. Pt was raised to believe that her \"dad\" was her biological dad. Pt tells that the entire family knew that this was not true, but pt was never informed until just recently. Pt identifes this as upsetting and traumatic.   Contributing Factors to Suicide Risk   Current/Past Psychiatric History Anxiety;Depression   Key Suicidal Symptoms Anxious;Helplessness;Hopelessness;Insomnia   Precipitants/Stressors/Interpersonal Concerns Barriers to accessing mental health treatment;Family turmoil;History of abuse;Recent events leading to humiliation/shame or despair   Protective Factors/Reason for Living Responsibility to children   Sexual Preference / Identity   Do You Identify as Male or Female? Female   Sexual Issues or Concerns No   Relationship Status Significant other   Comments Pt reported that she and her current boyfriend (Niles) have been together for almost one year. Pt tells that he is very supportive of her. Pt tells that they intend to move to [place], [place], where Niles will work for his uncle's bike shop.   Do You Have Children? Yes   Comments Pt tells that her son Timur is now 3 years old. He continues to reside with his father, who currently resides in [place]. Pt tells that she usually sees her son on , as the couple meet half-way in [place]. Pt is hopeful to get a stable residence and job so she can regain placement of Timur in the future.   Current Living Conditions   Living Arrangements  Spouse/significant other   Type of Residence Other (comment)  (temporarily staying in a hotel.)   Problems with Living Situation Yes   If yes, describe problems Pt tells that both she and her boyfriend are in between jobs. Pt shares that they lost their jobs due to COVID. Pt reports that they plan to move to [place], [place], where her boyfriend will have a job at his uncle's bike shop. Pt tells that they intend to move there soon.   Do You Have Any Weapons or Firearms at Home? No   Family/Social Issues   Family History of Completed Suicide No   Family History of Suicide Attempts No   Family History of Mental Health Diagnosis Yes   Description mom has depression, maternal grandmother has depression/anxiety   Family Member with Psychiatric Disorder Requiring Hospitalization Yes   Description Pt reports that her great grandmother was hospitalized for depression in [place] in the 1930's.   Any History of Family Substance Use Yes   Comments for Substance Use History Pt's mom and \"dad\" (really step-dad) both used AODA. Pt's sister has a history of using cocaine, and her brother has a history of using meth.    What are Your Sources of Hope, Strength, Comfort and Peace? \"hopefully to get stable\", my son   What do You Hold on to During Difficult Times? \"hopefully to get stable\", my son   What Sustains You and Keeps You Going? \"hopefully to get stable\", my son   Does Your Spiritual Beliefs Act as a Source of Comfort and Strength in Dealing with Life's Ups and Downs? Yes   Have You been and/or are You Active in AA, NA, CA and/or Other Support Groups? No   Are You Worried About any Conflicts Between Your Beliefs and Your Medical Care? Explain No   Spiritual Care Consult Needed Yes (T)   Do You Have Any Significant Financial Stressors? Yes   Financial Stressors No income  (Pt is currently not working.Pt/boyfriend lost jobs r/t COVID)   Employment / School   Employment Status Unemployed   Are You Attending School? No   Any  Employment/School/Vocational Issues? Yes   Explain Pt tells that she is currently unemployed. Pt tells that she was given a job offer in [place], but she could not start right away, so it was given to someone else. Pt tells that she has been working jobs through temporary agencies, as little has been available outside of this due to COVID.  Pt worked at [employer] most recently. In the past, pt worked at [employer] for 4 years, but pt shares that this was 2 years ago.   Provider Information and Community Support   How were you referred here Self   Referral Source Notified? Yes   Psychiatrist None  (Missed outpatient appointments with Dr. Grewal)   Primary Care Physician Yes   Name Dr. Luh MORENO Obtained Yes   Therapist None  (Pt had worked with Bettie Nguyễn prior to her leaving )    None   Any Other Providers Past and/or Present Yes   Name Pt has had previous admissions and was treated by Dr. Grewal during these hospitalizations. Pt was scheduled to see Dr. Grewal outpatient, but missed appointments due to work, per pt. Pt also worked with Bettie Nguyễn prior to her taking a new position. Pt had worked with Dr. Baker for primary care services until her departure. In the past, pt has worked with providers through [facility] and also worked with Saranya Thacker. Records indicate that pt has also worked with Dr. Vidales and Sailaja Aparicio in the past.   Most Recent Inpatient/Partial Hospitalization/IOP Yes   When 2/8/2020   Where [facility]   How Long 3 days   Level of Care Inpatient   Name of Probation /  N/A   Do You Have a Payee? No   Clinical Interpretation   Clinical Summary Pt is a 37 year old, unemployed, mother of one admitted voluntarily for increasing depression, anxiety, and [place]ation [place]. Pt reports frequent crying, difficulty sleeping, decreased appetite, anhedonia, and recent [place]ation. Pt shares 

## 2023-09-13 ENCOUNTER — TELEPHONE (OUTPATIENT)
Age: 88
End: 2023-09-13

## 2023-09-13 NOTE — TELEPHONE ENCOUNTER
Called pt. Notified her of Dr. Schmitt Going message/no change in meds. Discussed [facility] again that would qualify call back to us if consistent. Patient verbalized understanding and denied further questions or concerns.

## 2023-09-13 NOTE — TELEPHONE ENCOUNTER
Pt called back (separate message). Called pt. Verified patient's identity with two identifiers. She has not checked BP today, but other BP's listed below. I told her some of these are better than others, but all better than her BP in office. Dr. Merline Jensen note mentioned if her BP was trending up then her amlodipine could be doubled. I told pt I am not sure these readings would qualify increasing dose, since she has not been feeling well and said covid was going around her office. I said maybe if more reading than not were to be over 140 or close to 150 then we would definitely want to increase dose, but not sure. I said I would ask Dr. Luis Garcia to advise and call her back today or tomorrow morning. Patient verbalized understanding and denied further questions or concerns. Dr. Luis Garcia-  Do you want her to double amlodipine or continue monitoring?

## 2023-09-13 NOTE — TELEPHONE ENCOUNTER
I wouldn't change dose at this point, most of those look good and dont want to cause low bp at her age

## 2023-10-07 DIAGNOSIS — I10 HTN (HYPERTENSION), BENIGN: Primary | ICD-10-CM

## 2023-10-09 ENCOUNTER — TELEPHONE (OUTPATIENT)
Age: 88
End: 2023-10-09

## 2023-10-09 DIAGNOSIS — I10 HTN (HYPERTENSION), BENIGN: ICD-10-CM

## 2023-10-09 RX ORDER — AMLODIPINE BESYLATE 5 MG/1
5 TABLET ORAL DAILY
Qty: 30 TABLET | Refills: 1 | Status: SHIPPED | OUTPATIENT
Start: 2023-10-09

## 2023-10-09 RX ORDER — AMLODIPINE BESYLATE 5 MG/1
5 TABLET ORAL DAILY
Qty: 90 TABLET | Refills: 3 | Status: SHIPPED | OUTPATIENT
Start: 2023-10-09 | End: 2023-10-09 | Stop reason: SDUPTHER

## 2023-10-09 NOTE — TELEPHONE ENCOUNTER
Requested Prescriptions     Signed Prescriptions Disp Refills    amLODIPine (NORVASC) 5 MG tablet 90 tablet 3     Sig: TAKE 1 TABLET BY MOUTH  DAILY     Authorizing Provider: Elsa Hickman     Ordering User: Minda Koenig   Per Dr. Jonathan Marroquin verbal order.

## 2023-10-09 NOTE — TELEPHONE ENCOUNTER
Pt stated [facility] stated order from amlodipine 5mg was not received,  pt out of medication.   Pt would like a short supply of the amlodipine 5mg sent to [address] [place] [address]

## 2023-10-09 NOTE — TELEPHONE ENCOUNTER
Requested Prescriptions     Signed Prescriptions Disp Refills    amLODIPine (NORVASC) 5 MG tablet 30 tablet 1     Sig: Take 1 tablet by mouth daily     Authorizing Provider: Leonor Rodas     Ordering User: Julia Tena   Per Dr. Dominic Gallardo verbal order.

## 2023-12-12 ENCOUNTER — HOSPITAL ENCOUNTER (OUTPATIENT)
Facility: HOSPITAL | Age: 88
Discharge: HOME | End: 2023-12-15
Attending: INTERNAL MEDICINE
Payer: MEDICARE

## 2023-12-12 DIAGNOSIS — E05.20 GOITER, TOXIC, MULTINODULAR: ICD-10-CM

## 2023-12-12 PROCEDURE — A9516 IODINE I-123 SOD IODIDE MIC: HCPCS | Performed by: INTERNAL MEDICINE

## 2023-12-12 PROCEDURE — 78014 THYROID IMAGING W/BLOOD FLOW: CPT

## 2023-12-12 PROCEDURE — 3430000000 HC RX DIAGNOSTIC RADIOPHARMACEUTICAL: Performed by: INTERNAL MEDICINE

## 2023-12-12 RX ORDER — SODIUM IODIDE I 123 200 UCI/1
291 CAPSULE, GELATIN COATED ORAL ONCE
Status: COMPLETED | OUTPATIENT
Start: 2023-12-12 | End: 2023-12-12

## 2023-12-12 RX ADMIN — SODIUM IODIDE I 123 291 MICRO CURIE: 200 CAPSULE, GELATIN COATED ORAL at 09:10

## 2023-12-13 ENCOUNTER — HOSPITAL ENCOUNTER (OUTPATIENT)
Facility: HOSPITAL | Age: 88
Discharge: HOME | End: 2023-12-16
Attending: INTERNAL MEDICINE

## 2024-04-22 SDOH — HEALTH STABILITY: PHYSICAL HEALTH: ON AVERAGE, HOW MANY DAYS PER WEEK DO YOU ENGAGE IN MODERATE TO STRENUOUS EXERCISE (LIKE A BRISK WALK)?: 1 DAY

## 2024-04-22 SDOH — HEALTH STABILITY: PHYSICAL HEALTH: ON AVERAGE, HOW MANY MINUTES DO YOU ENGAGE IN EXERCISE AT THIS LEVEL?: 50 MIN

## 2024-04-22 ASSESSMENT — PATIENT HEALTH QUESTIONNAIRE - PHQ9
SUM OF ALL RESPONSES TO PHQ QUESTIONS 1-9: 0
SUM OF ALL RESPONSES TO PHQ QUESTIONS 1-9: 0
1. LITTLE INTEREST OR PLEASURE IN DOING THINGS: NOT AT ALL
SUM OF ALL RESPONSES TO PHQ9 QUESTIONS 1 & 2: 0
SUM OF ALL RESPONSES TO PHQ QUESTIONS 1-9: 0
SUM OF ALL RESPONSES TO PHQ QUESTIONS 1-9: 0
2. FEELING DOWN, DEPRESSED OR HOPELESS: NOT AT ALL

## 2024-04-22 ASSESSMENT — LIFESTYLE VARIABLES
HOW MANY STANDARD DRINKS CONTAINING ALCOHOL DO YOU HAVE ON A TYPICAL DAY: 1
HOW OFTEN DO YOU HAVE A DRINK CONTAINING ALCOHOL: 2-4 TIMES A MONTH
HOW MANY STANDARD DRINKS CONTAINING ALCOHOL DO YOU HAVE ON A TYPICAL DAY: 1 OR 2
HOW OFTEN DO YOU HAVE SIX OR MORE DRINKS ON ONE OCCASION: 1
HOW OFTEN DO YOU HAVE A DRINK CONTAINING ALCOHOL: 3

## 2024-04-24 SDOH — ECONOMIC STABILITY: FOOD INSECURITY: WITHIN THE PAST 12 MONTHS, THE FOOD YOU BOUGHT JUST DIDN'T LAST AND YOU DIDN'T HAVE MONEY TO GET MORE.: NEVER TRUE

## 2024-04-24 SDOH — ECONOMIC STABILITY: INCOME INSECURITY: HOW HARD IS IT FOR YOU TO PAY FOR THE VERY BASICS LIKE FOOD, HOUSING, MEDICAL CARE, AND HEATING?: NOT HARD AT ALL

## 2024-04-24 SDOH — ECONOMIC STABILITY: FOOD INSECURITY: WITHIN THE PAST 12 MONTHS, YOU WORRIED THAT YOUR FOOD WOULD RUN OUT BEFORE YOU GOT MONEY TO BUY MORE.: NEVER TRUE

## 2024-04-24 SDOH — ECONOMIC STABILITY: HOUSING INSECURITY
IN THE LAST 12 MONTHS, WAS THERE A TIME WHEN YOU DID NOT HAVE A STEADY PLACE TO SLEEP OR SLEPT IN A SHELTER (INCLUDING NOW)?: NO

## 2024-04-24 SDOH — ECONOMIC STABILITY: TRANSPORTATION INSECURITY
IN THE PAST 12 MONTHS, HAS LACK OF TRANSPORTATION KEPT YOU FROM MEETINGS, WORK, OR FROM GETTING THINGS NEEDED FOR DAILY LIVING?: NO

## 2024-04-25 ENCOUNTER — OFFICE VISIT (OUTPATIENT)
Facility: CLINIC | Age: 89
End: 2024-04-25

## 2024-04-25 VITALS
WEIGHT: 149 LBS | HEART RATE: 78 BPM | HEIGHT: 64 IN | OXYGEN SATURATION: 97 % | TEMPERATURE: 97.5 F | DIASTOLIC BLOOD PRESSURE: 72 MMHG | RESPIRATION RATE: 16 BRPM | BODY MASS INDEX: 25.44 KG/M2 | SYSTOLIC BLOOD PRESSURE: 132 MMHG

## 2024-04-25 DIAGNOSIS — G89.29 CHRONIC BILATERAL LOW BACK PAIN WITHOUT SCIATICA: ICD-10-CM

## 2024-04-25 DIAGNOSIS — E53.8 DISORDER OF VITAMIN B12: ICD-10-CM

## 2024-04-25 DIAGNOSIS — Z00.00 MEDICARE ANNUAL WELLNESS VISIT, SUBSEQUENT: Primary | ICD-10-CM

## 2024-04-25 DIAGNOSIS — R63.4 WEIGHT LOSS: ICD-10-CM

## 2024-04-25 DIAGNOSIS — M54.50 CHRONIC BILATERAL LOW BACK PAIN WITHOUT SCIATICA: ICD-10-CM

## 2024-04-25 DIAGNOSIS — J45.50 SEVERE PERSISTENT ASTHMA WITHOUT COMPLICATION: ICD-10-CM

## 2024-04-25 DIAGNOSIS — Z78.0 ASYMPTOMATIC POSTMENOPAUSAL STATE: ICD-10-CM

## 2024-04-25 DIAGNOSIS — I10 HTN (HYPERTENSION), BENIGN: ICD-10-CM

## 2024-04-25 DIAGNOSIS — Z23 ENCOUNTER FOR IMMUNIZATION: ICD-10-CM

## 2024-04-25 DIAGNOSIS — E66.3 OVERWEIGHT WITH BODY MASS INDEX (BMI) OF 25 TO 25.9 IN ADULT: ICD-10-CM

## 2024-04-25 DIAGNOSIS — K21.9 GASTROESOPHAGEAL REFLUX DISEASE, UNSPECIFIED WHETHER ESOPHAGITIS PRESENT: ICD-10-CM

## 2024-04-25 DIAGNOSIS — M85.80 OSTEOPENIA WITH HIGH RISK OF FRACTURE: ICD-10-CM

## 2024-04-25 DIAGNOSIS — E78.2 MIXED HYPERLIPIDEMIA: ICD-10-CM

## 2024-04-25 RX ORDER — BUDESONIDE 0.5 MG/2ML
1 INHALANT ORAL 2 TIMES DAILY
COMMUNITY

## 2024-04-25 RX ORDER — FLUTICASONE PROPIONATE 50 MCG
1 SPRAY, SUSPENSION (ML) NASAL DAILY
COMMUNITY

## 2024-04-25 NOTE — PROGRESS NOTES
Chief Complaint   Patient presents with    Medicare AWV     \"Have you been to the ER, urgent care clinic since your last visit?  Hospitalized since your last visit?\"    NO    “Have you seen or consulted any other health care providers outside of [facility] since your last visit?”    NO        Medicare Awv Health Risk Assessment / Depression Screen       Question 4/22/2024  3:53 PM EDT - Filed by Patient    Fall Risk Screening     Do you feel unsteady or are you worried about falling? no    Have you fallen 2 or more times in the past year?   no    Have you had any fall with injury in the past year?   yes    Health Risk Assessment / General     In general, how would you say your health is? Good    In the past 7 days, have you experienced any of the following: New or Increased Pain, New or Increased Fatigue, Loneliness, Social Isolation, Stress or Anger? Yes    Select all that apply New or Increased Pain    Do you have a Living Will? Yes    Health Habits/ Nutrition     On average, how many days per week do you engage in moderate to strenuous exercise (like a brisk walk)? 1 day    On average, how many minutes do you engage in exercise at this level? 50 min    Do you eat balanced/healthy meals regularly? Yes    Have you seen the dentist within the past year? Yes    Hearing / Vision     Have you had an eye exam within the past year? Appointment is scheduled    Do you have difficulty driving, watching TV, or doing any of your daily activities because of your eyesight? No    Do you or your family notice any trouble with your hearing that hasn't been managed with hearing aids? No    Safety     Do you have working smoke detectors? Yes    Do you have any tripping hazards - loose or unsecured carpets or rugs? Yes    Do you have non-slip mats or non-slip surfaces or shower bars or grab bars in your shower or bathtub? Yes    Do you always fasten your seatbelt when you are in a car? Yes    ADLs / Activities of Daily 
2 times daily Yes Keila, MD Zee   revefenacin 175 MCG/3ML SOLN Inhale into the lungs Yes Provider, MD Zee   fluticasone (FLONASE) 50 MCG/ACT nasal spray 1 spray by Each Nostril route daily Yes Provider, MD Zee   GLUCOSAMINE-CHONDROITIN PO Take by mouth Yes Provider, MD Zee   amLODIPine (NORVASC) 5 MG tablet Take 1 tablet by mouth daily Yes Jamshid Davies III, MD   chlorthalidone (HYGROTON) 25 MG tablet TAKE 1 TABLET BY MOUTH  DAILY Yes Jamshid Davies III, MD   Multiple Vitamins-Minerals (MULTI FOR HER 50+) TABS Take 1 tablet by mouth Yes ProviderZee MD   Azelastine-Fluticasone 137-50 MCG/ACT SUSP  Yes ProviderZee MD   triamcinolone (KENALOG) 0.1 % ointment  Yes Provider, MD Zee   atorvastatin (LIPITOR) 40 MG tablet TAKE 1 TABLET BY MOUTH AT  NIGHT Yes Jamshid Davies III, MD   albuterol sulfate HFA (PROVENTIL;VENTOLIN;PROAIR) 108 (90 Base) MCG/ACT inhaler Inhale 2 puffs into the lungs every 6 hours as needed Yes Automatic Reconciliation, Ar   alendronate (FOSAMAX) 70 MG tablet Take 1 tablet by mouth every 7 days Yes Automatic Reconciliation, Ar   Calcium Carbonate-Vitamin D 600-3.125 MG-MCG TABS Take 1 tablet by mouth 2 times daily Yes Automatic Reconciliation, Ar   estradiol (ESTRACE) 0.1 MG/GM vaginal cream ceived the following from [facility]: Outside name: estradioL (ESTRACE) 0.01 % (0.1 mg/gram) vaginal cream Yes Automatic Reconciliation, Ar   mirabegron (MYRBETRIQ) 50 MG TB24 ceived the following from [facility]: Outside name: Myrbetriq 50 mg ER tablet Yes Automatic Reconciliation, Ar   montelukast (SINGULAIR) 10 MG tablet Take 1 tablet by mouth daily Yes Automatic Reconciliation, Ar   omeprazole (PRILOSEC) 40 MG delayed release capsule Take 1 capsule by mouth daily Yes Automatic Reconciliation, Ar       CareTeam (Including outside providers/suppliers regularly involved in providing care):   Patient Care

## 2024-04-26 LAB
ALBUMIN SERPL-MCNC: 4.3 G/DL (ref 3.7–4.7)
ALBUMIN/GLOB SERPL: 1.7 {RATIO} (ref 1.2–2.2)
ALP SERPL-CCNC: 73 IU/L (ref 44–121)
ALT SERPL-CCNC: 23 IU/L (ref 0–32)
AST SERPL-CCNC: 30 IU/L (ref 0–40)
BASOPHILS # BLD AUTO: 0.1 X10E3/UL (ref 0–0.2)
BASOPHILS NFR BLD AUTO: 1 %
BILIRUB SERPL-MCNC: 0.7 MG/DL (ref 0–1.2)
BUN SERPL-MCNC: 17 MG/DL (ref 8–27)
BUN/CREAT SERPL: 22 (ref 12–28)
CALCIUM SERPL-MCNC: 9.3 MG/DL (ref 8.7–10.3)
CHLORIDE SERPL-SCNC: 97 MMOL/L (ref 96–106)
CHOLEST SERPL-MCNC: 160 MG/DL (ref 100–199)
CO2 SERPL-SCNC: 24 MMOL/L (ref 20–29)
CREAT SERPL-MCNC: 0.78 MG/DL (ref 0.57–1)
EGFRCR SERPLBLD CKD-EPI 2021: 73 ML/MIN/1.73
EOSINOPHIL # BLD AUTO: 0.1 X10E3/UL (ref 0–0.4)
EOSINOPHIL NFR BLD AUTO: 2 %
ERYTHROCYTE [DISTWIDTH] IN BLOOD BY AUTOMATED COUNT: 12.9 % (ref 11.7–15.4)
GLOBULIN SER CALC-MCNC: 2.6 G/DL (ref 1.5–4.5)
GLUCOSE SERPL-MCNC: 100 MG/DL (ref 70–99)
HCT VFR BLD AUTO: 39.4 % (ref 34–46.6)
HDLC SERPL-MCNC: 36 MG/DL
HGB BLD-MCNC: 13.4 G/DL (ref 11.1–15.9)
IMM GRANULOCYTES # BLD AUTO: 0 X10E3/UL (ref 0–0.1)
IMM GRANULOCYTES NFR BLD AUTO: 0 %
LDLC SERPL CALC-MCNC: 110 MG/DL (ref 0–99)
LYMPHOCYTES # BLD AUTO: 1.8 X10E3/UL (ref 0.7–3.1)
LYMPHOCYTES NFR BLD AUTO: 27 %
MAGNESIUM SERPL-MCNC: 1.9 MG/DL (ref 1.6–2.3)
MCH RBC QN AUTO: 31.5 PG (ref 26.6–33)
MCHC RBC AUTO-ENTMCNC: 34 G/DL (ref 31.5–35.7)
MCV RBC AUTO: 93 FL (ref 79–97)
MONOCYTES # BLD AUTO: 0.6 X10E3/UL (ref 0.1–0.9)
MONOCYTES NFR BLD AUTO: 9 %
NEUTROPHILS # BLD AUTO: 4.1 X10E3/UL (ref 1.4–7)
NEUTROPHILS NFR BLD AUTO: 61 %
PLATELET # BLD AUTO: 319 X10E3/UL (ref 150–450)
POTASSIUM SERPL-SCNC: 3.6 MMOL/L (ref 3.5–5.2)
PROT SERPL-MCNC: 6.9 G/DL (ref 6–8.5)
RBC # BLD AUTO: 4.25 X10E6/UL (ref 3.77–5.28)
SODIUM SERPL-SCNC: 138 MMOL/L (ref 134–144)
TRIGL SERPL-MCNC: 72 MG/DL (ref 0–149)
VIT B12 SERPL-MCNC: 792 PG/ML (ref 232–1245)
VLDLC SERPL CALC-MCNC: 14 MG/DL (ref 5–40)
WBC # BLD AUTO: 6.7 X10E3/UL (ref 3.4–10.8)

## 2024-05-16 NOTE — TELEPHONE ENCOUNTER
4/29/2025  8:00 AM MEDICARE ANNUAL WELL VISIT [facility] Korina Pelaez, APRN - NP    Appointment Notes:   Return in about 1 year for AWV, follow up, chronic conditions/meds.

## 2024-05-17 RX ORDER — ALENDRONATE SODIUM 70 MG/1
TABLET ORAL
Qty: 12 TABLET | Refills: 3 | Status: SHIPPED | OUTPATIENT
Start: 2024-05-17

## 2024-05-22 ENCOUNTER — HOSPITAL ENCOUNTER (OUTPATIENT)
Facility: HOSPITAL | Age: 89
Discharge: HOME | End: 2024-05-25

## 2024-05-22 DIAGNOSIS — M85.80 OSTEOPENIA WITH HIGH RISK OF FRACTURE: ICD-10-CM

## 2024-05-22 DIAGNOSIS — Z78.0 ASYMPTOMATIC POSTMENOPAUSAL STATE: ICD-10-CM

## 2024-05-23 ENCOUNTER — HOSPITAL ENCOUNTER (OUTPATIENT)
Facility: HOSPITAL | Age: 89
Discharge: HOME | End: 2024-05-23
Payer: MEDICARE

## 2024-05-23 PROCEDURE — 77080 DXA BONE DENSITY AXIAL: CPT

## 2024-06-20 ENCOUNTER — TELEPHONE (OUTPATIENT)
Facility: CLINIC | Age: 89
End: 2024-06-20

## 2024-06-20 NOTE — TELEPHONE ENCOUNTER
Patient went to [facility] to  back brace and they no longer supply those. Patient was suggested to go to  [facility] to get it. They notified patient that the prescription would need to be sent to them in order to see if they had it in stock for patient. Phone number for [facility] is [phone] fax number is [phone] and address is [address] [place] [address].

## 2024-06-28 ENCOUNTER — TELEPHONE (OUTPATIENT)
Facility: CLINIC | Age: 89
End: 2024-06-28

## 2024-06-28 NOTE — TELEPHONE ENCOUNTER
Pt called in regards to the back brace order that was put in on 04/25/2024. PT stated when she called she was told that they did not have an order for her. Pt stated she would need it sent to [address], [address] [address].   Phone: [phone]  Fax: [phone]    Pt stated they're no longer ran by [facility]

## 2024-07-11 ENCOUNTER — TELEPHONE (OUTPATIENT)
Facility: CLINIC | Age: 89
End: 2024-07-11

## 2024-07-11 NOTE — TELEPHONE ENCOUNTER
Pt called in regards to the back brace. Pt stated she would like a call back to discuss some information that she found on the internet. Pt stated that she found it in stock, but was told it had to be ordered from the original , but would like to discuss?   5

## 2024-07-11 NOTE — TELEPHONE ENCOUNTER
Patient states Dr. Garcia had ordered a brace and she one time had a back doctor that had ordered the original, then Dr Garcia would order periodically. Patient states that [facility] fitted her for the brace. [facility] no longer does these.  Patient did have the box the brace came in, she read this information to me.  Lumbar Sacral Support-abdominal belt. Large

## 2024-07-11 NOTE — TELEPHONE ENCOUNTER
Patient states that when she entered this information into the computer that a place called [facility] has this but she is unable to order this.    Patient advised that she may have to see ortho to get? I advised patient that I would send message to provider.

## 2024-07-11 NOTE — TELEPHONE ENCOUNTER
Patient called in reference to back brace order. Patient states that [facility] did not receive either fax that we sent. I reached out to [facility], they did receive fax and advised that they do not know what type of brace patient needs. They would need more specifics of type of back brace, and they did state that they do not carry any binders or soft braces.        Patient still would like to receive a back brace.    Please advise

## 2024-07-12 NOTE — TELEPHONE ENCOUNTER
I tired [facility], they do not have these braces. I looked up the [employer] [facility] company and that is for direct purchase for the device. I contact patient and let her know that she should just purchase the device from the company. Patient stated she thought that was what she was going to have to do.

## 2024-07-30 DIAGNOSIS — I10 HTN (HYPERTENSION), BENIGN: Primary | ICD-10-CM

## 2024-07-30 RX ORDER — CHLORTHALIDONE 25 MG/1
25 TABLET ORAL DAILY
Qty: 90 TABLET | Refills: 1 | Status: SHIPPED | OUTPATIENT
Start: 2024-07-30

## 2024-07-30 NOTE — TELEPHONE ENCOUNTER
Requested Prescriptions     Signed Prescriptions Disp Refills    chlorthalidone (HYGROTON) 25 MG tablet 90 tablet 1     Sig: TAKE 1 TABLET BY MOUTH DAILY     Authorizing Provider: SHILPI DAVIES III     Ordering User: CHIQUIS MONTILLA    Per Dr. Davies's verbal order.

## 2024-08-05 ENCOUNTER — HOSPITAL ENCOUNTER (OUTPATIENT)
Facility: HOSPITAL | Age: 89
Discharge: HOME | End: 2024-08-08
Attending: INTERNAL MEDICINE
Payer: MEDICARE

## 2024-08-05 DIAGNOSIS — R91.8 PULMONARY NODULES: ICD-10-CM

## 2024-08-05 PROCEDURE — 71250 CT THORAX DX C-: CPT

## 2024-08-06 ENCOUNTER — HOSPITAL ENCOUNTER (EMERGENCY)
Facility: HOSPITAL | Age: 89
Discharge: HOME | End: 2024-08-06
Attending: STUDENT IN AN ORGANIZED HEALTH CARE EDUCATION/TRAINING PROGRAM
Payer: MEDICARE

## 2024-08-06 VITALS
HEART RATE: 83 BPM | HEIGHT: 64 IN | RESPIRATION RATE: 16 BRPM | TEMPERATURE: 98.1 F | WEIGHT: 149.03 LBS | BODY MASS INDEX: 25.44 KG/M2 | DIASTOLIC BLOOD PRESSURE: 67 MMHG | OXYGEN SATURATION: 94 % | SYSTOLIC BLOOD PRESSURE: 154 MMHG
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
## 2024-08-06 DIAGNOSIS — S81.812A NONINFECTED SKIN TEAR OF LEFT LOWER EXTREMITY, INITIAL ENCOUNTER: Primary | ICD-10-CM

## 2024-08-06 PROCEDURE — 99282 EMERGENCY DEPT VISIT SF MDM: CPT

## 2024-08-06 ASSESSMENT — PAIN DESCRIPTION - PAIN TYPE
TYPE: ACUTE PAIN
TYPE: ACUTE PAIN

## 2024-08-06 ASSESSMENT — PAIN - FUNCTIONAL ASSESSMENT
PAIN_FUNCTIONAL_ASSESSMENT: 0-10

## 2024-08-06 ASSESSMENT — PAIN SCALES - GENERAL
PAINLEVEL_OUTOF10: 4
PAINLEVEL_OUTOF10: 5
PAINLEVEL_OUTOF10: 5
PAINLEVEL_OUTOF10: 4

## 2024-08-06 ASSESSMENT — PAIN DESCRIPTION - DESCRIPTORS
DESCRIPTORS: ACHING
DESCRIPTORS: TENDER

## 2024-08-06 ASSESSMENT — PAIN DESCRIPTION - LOCATION
LOCATION: LEG
LOCATION: KNEE
LOCATION: KNEE

## 2024-08-06 ASSESSMENT — PAIN DESCRIPTION - ORIENTATION
ORIENTATION: LEFT

## 2024-08-06 ASSESSMENT — LIFESTYLE VARIABLES
HOW OFTEN DO YOU HAVE A DRINK CONTAINING ALCOHOL: MONTHLY OR LESS
HOW MANY STANDARD DRINKS CONTAINING ALCOHOL DO YOU HAVE ON A TYPICAL DAY: 1 OR 2

## 2024-08-06 NOTE — ED PROVIDER NOTES
[facility] [place]PT  EMERGENCY DEPARTMENT ENCOUNTER      Pt Name: Inez Dangelo  MRN: [id number]  Birthdate 4/26/1934  Date of evaluation: 8/6/2024  Provider: Imani Cerda MD    CHIEF COMPLAINT       Chief Complaint   Patient presents with    Fall    Abrasion     HISTORY OF PRESENT ILLNESS   (Location/Symptom, Timing/Onset, Context/Setting, Quality, Duration, Modifying Factors, Severity)  Note limiting factors.   HPI  89 yo F presents to the ER for evaluation of left knee skin tear sustained after a mechanical fall just prior to arrival.  She is not on blood thinners.  States she did not injure herself elsewhere from this fall, specifically denies hitting her head or loss of consciousness.  She was able to bear weight onto the left knee immediately after the fall, and denies significant pain in the joint itself.  She has no weakness or numbness at distal aspect of the left lower extremity.    Review of External Medical Records:     Nursing Notes were reviewed.    REVIEW OF SYSTEMS    (2-9 systems for level 4, 10 or more for level 5)     Review of Systems   All other systems reviewed and are negative.      Except as noted above the remainder of the review of systems was reviewed and negative.       PAST MEDICAL HISTORY     Past Medical History:   Diagnosis Date    Arthritis     Asthma     Basal cell carcinoma (BCC)     Cancer (HCC)     squamous cell skin cancer    Chest pain, unspecified     Coagulation defects     bleeding as child due to lack of vitamin K - no problems now    GERD (gastroesophageal reflux disease)     Hearing loss     wears hearing aides    History of bone density study 01/26/2009    HTN (hypertension), benign     Mixed hyperlipidemia     Nonspecific abnormal unspecified cardiovascular function study 12/20/2010         SURGICAL HISTORY       Past Surgical History:   Procedure Laterality Date    CATARACT REMOVAL  2011    COLONOSCOPY  03/2012    GYN      D & C    HEENT  12/07/2017    mouth

## 2024-08-06 NOTE — ED NOTES
Patient does not appear to be in any acute distress/shows no evidence of clinical instability at this time.     Provider has reviewed discharge instructions with the patient & family.  The patient & family verbalized understanding of discharge instructions as well as need for follow up for any worsening or further symptoms. PCP follow up recommended as well as specialist follow up:      Discharge papers given to patient & family, education provided, and all questions answered. Patient discharged by provider.    Patient leaving ED in private vehicle, ambulatory.

## 2024-08-06 NOTE — ED TRIAGE NOTES
Pt arrives to ER POV c/o left knee pain after a ground level fall. She got her foot caught and fell to the ground and has a skin tear to her left knee bleeding controled in triage. She is unsure of hitting her head. She is not on blood thinners.

## 2024-08-07 ENCOUNTER — TELEPHONE (OUTPATIENT)
Facility: CLINIC | Age: 89
End: 2024-08-07

## 2024-08-07 NOTE — TELEPHONE ENCOUNTER
Patient states that she is in pain and feels like she needs to be sooner. She also fell last week and went to care now. She hurt her ribs then.  Patient did state that she had to have a CT done on Monday that pulmonary ordered and Dr Alberto made sure it had her ribs in there as well but she has not heard from those results yet.

## 2024-08-07 NOTE — TELEPHONE ENCOUNTER
08/07/24 1:56PM Patient is calling to inform that she was just seen in the ER 08/06/24 for a fall and skinning up her knee she states that the ED bandaged her up, however they wanted her to follow-up with her PCP----patient is scheduled for 08/27/24 at 1:00PM to see Latanya as that is the 1st available, but would like a call as she states she needs to be seen sooner----she can be reached at [phone].      08/08/24 11:18AM SPW patient and confirmed to change her appt to 08/15/24 at 1:30pm and that she will arrive at 1:00PM.

## 2024-08-10 DIAGNOSIS — E78.2 MIXED HYPERLIPIDEMIA: Primary | ICD-10-CM

## 2024-08-12 RX ORDER — ATORVASTATIN CALCIUM 40 MG/1
TABLET, FILM COATED ORAL
Qty: 90 TABLET | Refills: 3 | Status: SHIPPED | OUTPATIENT
Start: 2024-08-12

## 2024-08-12 NOTE — TELEPHONE ENCOUNTER
Requested Prescriptions     Signed Prescriptions Disp Refills    atorvastatin (LIPITOR) 40 MG tablet 90 tablet 3     Sig: TAKE 1 TABLET BY MOUTH AT NIGHT     Authorizing Provider: SHILPI DAVIES III     Ordering User: CHIQUIS MONTILLA   Per Dr. Davies's verbal order.

## 2024-08-15 ENCOUNTER — OFFICE VISIT (OUTPATIENT)
Facility: CLINIC | Age: 89
End: 2024-08-15

## 2024-08-15 VITALS
HEIGHT: 64 IN | OXYGEN SATURATION: 98 % | WEIGHT: 151 LBS | TEMPERATURE: 97.5 F | RESPIRATION RATE: 16 BRPM | HEART RATE: 82 BPM | DIASTOLIC BLOOD PRESSURE: 70 MMHG | BODY MASS INDEX: 25.78 KG/M2 | SYSTOLIC BLOOD PRESSURE: 140 MMHG

## 2024-08-15 DIAGNOSIS — S81.812S NONINFECTED SKIN TEAR OF LEFT LEG, SEQUELA: Primary | ICD-10-CM

## 2024-08-15 DIAGNOSIS — S81.812D LACERATION OF LEFT LOWER EXTREMITY, SUBSEQUENT ENCOUNTER: ICD-10-CM

## 2024-08-15 DIAGNOSIS — Z09 HOSPITAL DISCHARGE FOLLOW-UP: ICD-10-CM
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
## 2024-08-15 DIAGNOSIS — L03.116 CELLULITIS OF LEFT LOWER EXTREMITY: ICD-10-CM

## 2024-08-15 RX ORDER — ARFORMOTEROL TARTRATE 15 UG/2ML
1 SOLUTION RESPIRATORY (INHALATION) 2 TIMES DAILY
COMMUNITY

## 2024-08-15 RX ORDER — CEPHALEXIN 500 MG/1
500 CAPSULE ORAL 4 TIMES DAILY
Qty: 40 CAPSULE | Refills: 0 | Status: SHIPPED | OUTPATIENT
Start: 2024-08-15 | End: 2024-08-25

## 2024-08-15 ASSESSMENT — PATIENT HEALTH QUESTIONNAIRE - PHQ9
SUM OF ALL RESPONSES TO PHQ QUESTIONS 1-9: 0
SUM OF ALL RESPONSES TO PHQ9 QUESTIONS 1 & 2: 0
1. LITTLE INTEREST OR PLEASURE IN DOING THINGS: NOT AT ALL
2. FEELING DOWN, DEPRESSED OR HOPELESS: NOT AT ALL

## 2024-08-15 NOTE — PROGRESS NOTES
Chief Complaint   Patient presents with    Follow-Up from Hospital     Fell last Tuesday, went to [facility] ER     \"Have you been to the ER, urgent care clinic since your last visit?  Hospitalized since your last visit?\"    NO    “Have you seen or consulted any other health care providers outside of [facility] since your last visit?”    NO            Click Here for Release of Records Request   PHQ-9 Total Score: 0 (8/15/2024  1:28 PM)         
Vitamins-Minerals (MULTI FOR HER 50+) TABS Take 1 tablet by mouth      Azelastine-Fluticasone 137-50 MCG/ACT SUSP       triamcinolone (KENALOG) 0.1 % ointment       albuterol sulfate HFA (PROVENTIL;VENTOLIN;PROAIR) 108 (90 Base) MCG/ACT inhaler Inhale 2 puffs into the lungs every 6 hours as needed      Calcium Carbonate-Vitamin D 600-3.125 MG-MCG TABS Take 1 tablet by mouth 2 times daily      estradiol (ESTRACE) 0.1 MG/GM vaginal cream ceived the following from [facility]: Outside name: estradioL (ESTRACE) 0.01 % (0.1 mg/gram) vaginal cream      mirabegron (MYRBETRIQ) 50 MG TB24 ceived the following from [facility]: Outside name: Myrbetriq 50 mg ER tablet      montelukast (SINGULAIR) 10 MG tablet Take 1 tablet by mouth daily      omeprazole (PRILOSEC) 40 MG delayed release capsule Take 1 capsule by mouth daily       No current facility-administered medications on file prior to visit.     Allergies   Allergen Reactions    Ketotifen Other (See Comments)     Redness of eye, irritation.    Lisinopril Cough    Losartan Other (See Comments)    Wheat Other (See Comments)    Amoxicillin Rash    Aspirin Rash           Objective  Vitals:    08/15/24 1322   BP: (!) 140/70   Pulse: 82   Resp: 16   Temp: 97.5 °F (36.4 °C)   SpO2: 98%      .FLOWAMB[14   Body mass index is 25.92 kg/m².    Physical Exam  Vitals and nursing note reviewed.   Constitutional:       General: She is not in acute distress.     Appearance: Normal appearance.   HENT:      Head: Normocephalic.   Eyes:      Extraocular Movements: Extraocular movements intact.   Cardiovascular:      Rate and Rhythm: Normal rate.   Pulmonary:      Effort: Pulmonary effort is normal.   Musculoskeletal:         General: Normal range of motion.      Cervical back: Normal range of motion.      Right lower leg: No edema.      Left lower leg: No edema.   Skin:     General: Skin is warm.      Comments: Left knee laceration, approx 6cm with surrounding

## 2024-08-23 ENCOUNTER — OFFICE VISIT (OUTPATIENT)
Facility: CLINIC | Age: 89
End: 2024-08-23

## 2024-08-23 VITALS
RESPIRATION RATE: 16 BRPM | OXYGEN SATURATION: 95 % | BODY MASS INDEX: 26.12 KG/M2 | DIASTOLIC BLOOD PRESSURE: 78 MMHG | SYSTOLIC BLOOD PRESSURE: 138 MMHG | HEIGHT: 64 IN | TEMPERATURE: 97.5 F | HEART RATE: 80 BPM | WEIGHT: 153 LBS

## 2024-08-23 DIAGNOSIS — S81.812D LACERATION OF LEFT LOWER EXTREMITY, SUBSEQUENT ENCOUNTER: Primary | ICD-10-CM

## 2024-08-23 DIAGNOSIS — L03.116 CELLULITIS OF LEFT LOWER EXTREMITY: ICD-10-CM

## 2024-08-23 ASSESSMENT — PATIENT HEALTH QUESTIONNAIRE - PHQ9
SUM OF ALL RESPONSES TO PHQ QUESTIONS 1-9: 0
1. LITTLE INTEREST OR PLEASURE IN DOING THINGS: NOT AT ALL
2. FEELING DOWN, DEPRESSED OR HOPELESS: NOT AT ALL
SUM OF ALL RESPONSES TO PHQ9 QUESTIONS 1 & 2: 0
SUM OF ALL RESPONSES TO PHQ QUESTIONS 1-9: 0

## 2024-08-23 NOTE — PROGRESS NOTES
Chief Complaint   Patient presents with    OTHER     Recheck leg     \"Have you been to the ER, urgent care clinic since your last visit?  Hospitalized since your last visit?\"    NO    “Have you seen or consulted any other health care providers outside of [facility] since your last visit?”    NO            Click Here for Release of Records Request   PHQ-9 Total Score: 0 (8/23/2024  8:47 AM)

## 2024-08-23 NOTE — PROGRESS NOTES
Subjective  Chief Complaint   Patient presents with    OTHER     Recheck leg     HPI:  Inez Dangelo is a 89 y.o. female.  Presents for wound check. She feels left knee laceration is healing well. She continues to wash daily in the shower by letting water and soap flow over the wound. She continues to cover it with a dressing. Mobility is improving. Continues to take antibiotics.       Past Medical History:   Diagnosis Date    Arthritis     Asthma     Basal cell carcinoma (BCC)     Cancer (HCC)     squamous cell skin cancer    Chest pain, unspecified     Coagulation defects     bleeding as child due to lack of vitamin K - no problems now    GERD (gastroesophageal reflux disease)     Hearing loss     wears hearing aides    History of bone density study 01/26/2009    HTN (hypertension), benign     Mixed hyperlipidemia     Nonspecific abnormal unspecified cardiovascular function study 12/20/2010     Family History   Problem Relation Age of Onset    Depression Maternal Aunt     Coronary Art Dis Father     Cancer Mother         lymphoma    Parkinsonism Sister     Parkinsonism Sister     Diabetes Maternal Grandmother      Social History     Socioeconomic History    Marital status:      Spouse name: Not on file    Number of children: Not on file    Years of education: Not on file    Highest education level: Not on file   Occupational History    Not on file   Tobacco Use    Smoking status: Never    Smokeless tobacco: Never   Vaping Use    Vaping status: Never Used   Substance and Sexual Activity    Alcohol use: Yes     Alcohol/week: 0.8 standard drinks of alcohol    Drug use: No    Sexual activity: Not Currently   Other Topics Concern    Not on file   Social History Narrative    Not on file     Social Determinants of Health     Financial Resource Strain: Low Risk  (4/25/2023)    Overall Financial Resource Strain (CARDIA)     Difficulty of Paying Living Expenses: Not hard at all   Food Insecurity: Not on file

## 2024-09-06 ENCOUNTER — OFFICE VISIT (OUTPATIENT)
Age: 89
End: 2024-09-06
Payer: MEDICARE

## 2024-09-06 VITALS
WEIGHT: 150.8 LBS | BODY MASS INDEX: 25.74 KG/M2 | SYSTOLIC BLOOD PRESSURE: 128 MMHG | DIASTOLIC BLOOD PRESSURE: 52 MMHG | HEIGHT: 64 IN | OXYGEN SATURATION: 94 % | HEART RATE: 82 BPM

## 2024-09-06 DIAGNOSIS — E78.2 MIXED HYPERLIPIDEMIA: ICD-10-CM

## 2024-09-06 DIAGNOSIS — R06.02 SHORTNESS OF BREATH: ICD-10-CM

## 2024-09-06 DIAGNOSIS — I10 HTN (HYPERTENSION), BENIGN: Primary | ICD-10-CM

## 2024-09-06 PROCEDURE — 99214 OFFICE O/P EST MOD 30 MIN: CPT | Performed by: SPECIALIST

## 2024-09-06 PROCEDURE — G8419 CALC BMI OUT NRM PARAM NOF/U: HCPCS | Performed by: SPECIALIST

## 2024-09-06 PROCEDURE — G8427 DOCREV CUR MEDS BY ELIG CLIN: HCPCS | Performed by: SPECIALIST

## 2024-09-06 PROCEDURE — 1090F PRES/ABSN URINE INCON ASSESS: CPT | Performed by: SPECIALIST

## 2024-09-06 PROCEDURE — 1036F TOBACCO NON-USER: CPT | Performed by: SPECIALIST

## 2024-09-06 PROCEDURE — 1123F ACP DISCUSS/DSCN MKR DOCD: CPT | Performed by: SPECIALIST

## 2024-09-06 ASSESSMENT — PATIENT HEALTH QUESTIONNAIRE - PHQ9
1. LITTLE INTEREST OR PLEASURE IN DOING THINGS: NOT AT ALL
2. FEELING DOWN, DEPRESSED OR HOPELESS: NOT AT ALL
SUM OF ALL RESPONSES TO PHQ QUESTIONS 1-9: 0
SUM OF ALL RESPONSES TO PHQ9 QUESTIONS 1 & 2: 0

## 2024-09-06 NOTE — PROGRESS NOTES
HISTORY OF PRESENT ILLNESS  Inez Dangelo is a 89 y.o. female     SUMMARY:   Patient Active Problem List   Diagnosis    HTN (hypertension), benign    Overweight    Nonspecific abnormal results of cardiovascular function study    Osteopenia with high risk of fracture    Arthritis    GERD (gastroesophageal reflux disease)    Hypolipoproteinemia    Severe persistent asthma    Arthritis of lumbar spine    Mixed hyperlipidemia    Celiac disease            CARDIOLOGY STUDIES TO DATE:  3/27/08. The type of test was a treadmill - Standard Jovani Protocol with echocardiographic imaging. Exercise tolerance was fair. Cardiac stress testing was negative for chest pain and myocardial ischemia. Stress echocardiogram images showed normal resting LV wall motion and ischemia involving the inferior wall  Subsequent cath showed minimal cad    Chief Complaint   Patient presents with    Annual Exam     HTN (hypertension), benign    Hyperlipidemia       HPI :  Continues to do well with no cardiac complaints or problems with her medications.  Last year she had gone through some balance classes which helped but she is kind of fallen off the wagon with her exercises and she had a couple of minor falls.  Still has some occasional shortness of breath which responds to her inhaler and blood pressure is well-controlled.  Most recent lipid profile looked great except for low HDL.  Her goiter is being worked up and is stable.    CARDIAC ROS:   negative for chest pain, dyspnea, irregular heart beat, lower extremity edema, orthopnea, paroxysmal nocturnal dyspnea, and syncope    Family History   Problem Relation Age of Onset    Depression Maternal Aunt     Coronary Art Dis Father     Cancer Mother         lymphoma    Parkinsonism Sister     Parkinsonism Sister     Diabetes Maternal Grandmother        Past Medical History:   Diagnosis Date    Arthritis     Asthma     Basal cell carcinoma (BCC)     Cancer (HCC)     squamous cell skin

## 2024-09-30 DIAGNOSIS — I10 HTN (HYPERTENSION), BENIGN: ICD-10-CM

## 2024-09-30 RX ORDER — AMLODIPINE BESYLATE 5 MG/1
5 TABLET ORAL DAILY
Qty: 90 TABLET | Refills: 3 | Status: SHIPPED | OUTPATIENT
Start: 2024-09-30

## 2024-09-30 NOTE — TELEPHONE ENCOUNTER
Per VO of MD    LOV: 9/6/2024     Future Appointments   Date Time Provider Department Center   4/29/2025  8:00 AM Korina Pelaez, APRN - NP [facility] E[facility] DEP       Requested Prescriptions     Signed Prescriptions Disp Refills    amLODIPine (NORVASC) 5 MG tablet 90 tablet 3     Sig: TAKE 1 TABLET BY MOUTH DAILY     Authorizing Provider: SHILPI CORTEZ III     Ordering User: ROYER STILES

## 2025-02-20 DIAGNOSIS — I10 HTN (HYPERTENSION), BENIGN: ICD-10-CM

## 2025-02-20 RX ORDER — CHLORTHALIDONE 25 MG/1
25 TABLET ORAL DAILY
Qty: 90 TABLET | Refills: 3 | Status: SHIPPED | OUTPATIENT
Start: 2025-02-20

## 2025-02-20 NOTE — TELEPHONE ENCOUNTER
Requested Prescriptions     Signed Prescriptions Disp Refills    chlorthalidone (HYGROTON) 25 MG tablet 90 tablet 3     Sig: TAKE 1 TABLET BY MOUTH DAILY     Authorizing Provider: SHILPI CORTEZ III     Ordering User: SARAHY ZEPEDA      Future Appointments   Date Time Provider Department Center   4/29/2025  8:00 AM Korina Pelaez APRN - NP [facility] DEP      Per Verbal Order by MD/NP

## 2025-04-28 SDOH — ECONOMIC STABILITY: FOOD INSECURITY: WITHIN THE PAST 12 MONTHS, YOU WORRIED THAT YOUR FOOD WOULD RUN OUT BEFORE YOU GOT MONEY TO BUY MORE.: NEVER TRUE

## 2025-04-28 SDOH — ECONOMIC STABILITY: INCOME INSECURITY: IN THE LAST 12 MONTHS, WAS THERE A TIME WHEN YOU WERE NOT ABLE TO PAY THE MORTGAGE OR RENT ON TIME?: NO

## 2025-04-28 SDOH — ECONOMIC STABILITY: FOOD INSECURITY: WITHIN THE PAST 12 MONTHS, THE FOOD YOU BOUGHT JUST DIDN'T LAST AND YOU DIDN'T HAVE MONEY TO GET MORE.: NEVER TRUE

## 2025-04-28 SDOH — HEALTH STABILITY: PHYSICAL HEALTH: ON AVERAGE, HOW MANY DAYS PER WEEK DO YOU ENGAGE IN MODERATE TO STRENUOUS EXERCISE (LIKE A BRISK WALK)?: 1 DAY

## 2025-04-28 SDOH — HEALTH STABILITY: PHYSICAL HEALTH: ON AVERAGE, HOW MANY MINUTES DO YOU ENGAGE IN EXERCISE AT THIS LEVEL?: 60 MIN

## 2025-04-28 SDOH — ECONOMIC STABILITY: TRANSPORTATION INSECURITY
IN THE PAST 12 MONTHS, HAS THE LACK OF TRANSPORTATION KEPT YOU FROM MEDICAL APPOINTMENTS OR FROM GETTING MEDICATIONS?: NO

## 2025-04-28 ASSESSMENT — PATIENT HEALTH QUESTIONNAIRE - PHQ9
SUM OF ALL RESPONSES TO PHQ QUESTIONS 1-9: 0
SUM OF ALL RESPONSES TO PHQ QUESTIONS 1-9: 0
2. FEELING DOWN, DEPRESSED OR HOPELESS: NOT AT ALL
1. LITTLE INTEREST OR PLEASURE IN DOING THINGS: NOT AT ALL
SUM OF ALL RESPONSES TO PHQ QUESTIONS 1-9: 0
SUM OF ALL RESPONSES TO PHQ QUESTIONS 1-9: 0

## 2025-04-28 ASSESSMENT — LIFESTYLE VARIABLES
HOW MANY STANDARD DRINKS CONTAINING ALCOHOL DO YOU HAVE ON A TYPICAL DAY: 1
HOW MANY STANDARD DRINKS CONTAINING ALCOHOL DO YOU HAVE ON A TYPICAL DAY: 1 OR 2
HOW OFTEN DO YOU HAVE SIX OR MORE DRINKS ON ONE OCCASION: 1
HOW OFTEN DO YOU HAVE A DRINK CONTAINING ALCOHOL: 98
HOW OFTEN DO YOU HAVE A DRINK CONTAINING ALCOHOL: PATIENT DECLINED

## 2025-04-29 ENCOUNTER — OFFICE VISIT (OUTPATIENT)
Facility: CLINIC | Age: 89
End: 2025-04-29
Payer: MEDICARE

## 2025-04-29 VITALS
RESPIRATION RATE: 16 BRPM | HEIGHT: 64 IN | SYSTOLIC BLOOD PRESSURE: 132 MMHG | HEART RATE: 77 BPM | WEIGHT: 144 LBS | BODY MASS INDEX: 24.59 KG/M2 | OXYGEN SATURATION: 97 % | DIASTOLIC BLOOD PRESSURE: 72 MMHG | TEMPERATURE: 97.5 F

## 2025-04-29 DIAGNOSIS — Z91.81 AT MODERATE RISK FOR FALL: ICD-10-CM

## 2025-04-29 DIAGNOSIS — Z91.81 HISTORY OF FALL: ICD-10-CM

## 2025-04-29 DIAGNOSIS — E78.6 HYPOLIPOPROTEINEMIA: ICD-10-CM

## 2025-04-29 DIAGNOSIS — I10 HTN (HYPERTENSION), BENIGN: ICD-10-CM

## 2025-04-29 DIAGNOSIS — Z23 ENCOUNTER FOR IMMUNIZATION: ICD-10-CM

## 2025-04-29 DIAGNOSIS — J45.50 SEVERE PERSISTENT ASTHMA WITHOUT COMPLICATION (HCC): ICD-10-CM

## 2025-04-29 DIAGNOSIS — Z00.00 MEDICARE ANNUAL WELLNESS VISIT, SUBSEQUENT: Primary | ICD-10-CM

## 2025-04-29 DIAGNOSIS — M85.80 OSTEOPENIA WITH HIGH RISK OF FRACTURE: ICD-10-CM

## 2025-04-29 DIAGNOSIS — E04.2 NONTOXIC MULTINODULAR GOITER: ICD-10-CM

## 2025-04-29 PROCEDURE — G8427 DOCREV CUR MEDS BY ELIG CLIN: HCPCS | Performed by: NURSE PRACTITIONER

## 2025-04-29 PROCEDURE — 1160F RVW MEDS BY RX/DR IN RCRD: CPT | Performed by: NURSE PRACTITIONER

## 2025-04-29 PROCEDURE — G0439 PPPS, SUBSEQ VISIT: HCPCS | Performed by: NURSE PRACTITIONER

## 2025-04-29 PROCEDURE — G8420 CALC BMI NORM PARAMETERS: HCPCS | Performed by: NURSE PRACTITIONER

## 2025-04-29 PROCEDURE — 1159F MED LIST DOCD IN RCRD: CPT | Performed by: NURSE PRACTITIONER

## 2025-04-29 PROCEDURE — 1126F AMNT PAIN NOTED NONE PRSNT: CPT | Performed by: NURSE PRACTITIONER

## 2025-04-29 PROCEDURE — 1123F ACP DISCUSS/DSCN MKR DOCD: CPT | Performed by: NURSE PRACTITIONER

## 2025-04-29 PROCEDURE — 1036F TOBACCO NON-USER: CPT | Performed by: NURSE PRACTITIONER

## 2025-04-29 PROCEDURE — 1090F PRES/ABSN URINE INCON ASSESS: CPT | Performed by: NURSE PRACTITIONER

## 2025-04-29 PROCEDURE — 99214 OFFICE O/P EST MOD 30 MIN: CPT | Performed by: NURSE PRACTITIONER

## 2025-04-29 NOTE — PROGRESS NOTES
Chief Complaint   Patient presents with    Medicare AWV     \"Have you been to the ER, urgent care clinic since your last visit?  Hospitalized since your last visit?\"    NO    “Have you seen or consulted any other health care providers outside of [facility] since your last visit?”    NO        Patient-Entered Cms Msp: Part I       Question 4/28/2025 12:39 PM EDT - Filed by Patient    Medicare requires that we periodically ask the following questions.       Are you receiving Black Lung (BL) benefits? No    Are the services to be paid by a government research program? No    Are you entitled to benefits through the Department of Veterans Affairs (DVA)? No    Was the illness/injury due to a work-related accident/condition? No    Was the illness/injury due to a non-work-related accident? No    Are you entitled to Medicare based on:     Age? Yes    End-stage renal disease (ESRD)? No            Patient-Entered Msp: Age-Disability-Esrd Primary Branch Calculation (range: 0 - 5) 1    Are you currently employed? No, Retired    Date of MCC: 4/1/1999    Do you have a spouse who is currently employed? No, Retired    Date of MCC: 9/1/1944    Thank you for answering this questionnaire.             Medicare Awv Health Risk Assessment / Depression Screen       Question 4/28/2025 12:59 PM EDT - Filed by Patient    Fall Risk Screening     Do you feel unsteady or are you worried about falling? no    Have you fallen 2 or more times in the past year?   yes    Have you had any fall with injury in the past year?   yes    Health Risk Assessment / General     In general, how would you say your health is? Good    In the past 7 days, have you experienced any of the following: New or Increased Pain, New or Increased Fatigue, Loneliness, Social Isolation, Stress or Anger? No    Do you have a Living Will? Yes    Health Habits/ Nutrition     On average, how many days per week do you engage in moderate to strenuous

## 2025-04-29 NOTE — PROGRESS NOTES
Medicare Annual Wellness Visit    Inez Dangelo is here for Medicare AWV    Assessment & Plan  1. Medicare wellness examination.  - Advised to maintain an active lifestyle throughout the day and avoid prolonged periods of sitting.  - Encouraged to secure any loose rugs in her home to prevent potential falls.  - Due for both the COVID-19 and RSV vaccines, which should be administered at least 2 weeks apart.  - Referral for physical therapy at [facility] will be provided today.    2. Chronic disease management.  - Following up with cardiology for blood pressure and cholesterol management and is on amlodipine, atorvastatin, and chlorthalidone.  - Following up with pulmonary for asthma management and is on inhalers and allergy medications.  - Under the care of urology for bladder management and is using Estrace cream and Myrbetriq.    3. Osteoporosis.  - Bone density scan from last year indicates a high risk of fracture.  - Advised to engage in resistance training, weight-bearing exercises, and walking to strengthen her bones.  - Encouraged to continue taking Fosamax weekly, although she has been taking it inconsistently.  - High risk for falls and fractures, so preserving bone health is crucial.    4. Arthritis.  - Appears to have arthritis in her hands.  - Advised to discuss hand exercises with her physical therapist to improve hand strength and potentially reduce fall risk.  - Recommended to move hands regularly to alleviate stiffness and pain.  - Physical therapy may help improve hand strength and decrease fall risk.    5. Thyroid management.  - Under the care of an endocrinologist for thyroid management, with her condition remaining stable.  - Results of today's laboratory tests will be forwarded to her endocrinologist.  - Scheduled for a follow-up visit in 1 year.    Medicare annual wellness visit, subsequent  Encounter for immunization  At moderate risk for fall  History of fall  BMI 24.0-24.9,

## 2025-04-29 NOTE — PATIENT INSTRUCTIONS
aren't sure how to get started with activity.  If you're already active, ask your doctor if there is anything you should change to stay safe as your body and health change.  If you tend to feel dizzy after you take medicine, avoid activity at that time. Try being active before you take your medicine. This will reduce your risk of falls.  If you plan to be active at home, make sure to clear your space before you get started. Remove things like TV cords, coffee tables, and throw rugs. It's safest to have plenty of space to move freely.  The key to getting more active is to take it slow and steady. Try to improve only a little bit at a time. Pick just one area to improve on at first. And if an activity hurts, stop and talk to your doctor.  Where can you learn more?  Go to https://www.icanbuy.net/patientEd and enter P600 to learn more about \"Learning About Being Active as an Older Adult.\"  Current as of: July 31, 2024  Content Version: 14.4  © 2024-2025 [employer].   Care instructions adapted under license by [employer] If you have questions about a medical condition or this instruction, always ask your healthcare professional. [employer] disclaims any warranty or liability for your use of this information.         A Healthy Heart: Care Instructions  Overview     Coronary artery disease, also called heart disease, occurs when a substance called plaque builds up in the vessels that supply oxygen-rich blood to your heart muscle. This can narrow the blood vessels and reduce blood flow. A heart attack happens when blood flow is completely blocked. A high-fat diet, smoking, and other factors increase the risk of heart disease.  Your doctor has found that you have a chance of having heart disease. A heart-healthy lifestyle can help keep your heart healthy and prevent heart disease. This lifestyle includes eating healthy, being active, staying at a weight that's healthy for you, and not smoking

## 2025-04-30 ENCOUNTER — RESULTS FOLLOW-UP (OUTPATIENT)
Facility: CLINIC | Age: 89
End: 2025-04-30

## 2025-04-30 LAB
ALBUMIN SERPL-MCNC: 4.2 G/DL (ref 3.6–4.6)
ALP SERPL-CCNC: 66 IU/L (ref 44–121)
ALT SERPL-CCNC: 23 IU/L (ref 0–32)
AST SERPL-CCNC: 27 IU/L (ref 0–40)
BASOPHILS # BLD AUTO: 0.1 X10E3/UL (ref 0–0.2)
BASOPHILS NFR BLD AUTO: 1 %
BILIRUB SERPL-MCNC: 0.5 MG/DL (ref 0–1.2)
BUN SERPL-MCNC: 18 MG/DL (ref 10–36)
BUN/CREAT SERPL: 24 (ref 12–28)
CALCIUM SERPL-MCNC: 9.2 MG/DL (ref 8.7–10.3)
CHLORIDE SERPL-SCNC: 98 MMOL/L (ref 96–106)
CHOLEST SERPL-MCNC: 143 MG/DL (ref 100–199)
CO2 SERPL-SCNC: 27 MMOL/L (ref 20–29)
CREAT SERPL-MCNC: 0.74 MG/DL (ref 0.57–1)
EGFRCR SERPLBLD CKD-EPI 2021: 76 ML/MIN/1.73
EOSINOPHIL # BLD AUTO: 0.2 X10E3/UL (ref 0–0.4)
EOSINOPHIL NFR BLD AUTO: 3 %
ERYTHROCYTE [DISTWIDTH] IN BLOOD BY AUTOMATED COUNT: 12.6 % (ref 11.7–15.4)
GLOBULIN SER CALC-MCNC: 2.6 G/DL (ref 1.5–4.5)
GLUCOSE SERPL-MCNC: 90 MG/DL (ref 70–99)
HCT VFR BLD AUTO: 39.5 % (ref 34–46.6)
HDLC SERPL-MCNC: 36 MG/DL
HGB BLD-MCNC: 13.1 G/DL (ref 11.1–15.9)
IMM GRANULOCYTES # BLD AUTO: 0 X10E3/UL (ref 0–0.1)
IMM GRANULOCYTES NFR BLD AUTO: 0 %
LDLC SERPL CALC-MCNC: 89 MG/DL (ref 0–99)
LYMPHOCYTES # BLD AUTO: 1.9 X10E3/UL (ref 0.7–3.1)
LYMPHOCYTES NFR BLD AUTO: 27 %
MCH RBC QN AUTO: 32.2 PG (ref 26.6–33)
MCHC RBC AUTO-ENTMCNC: 33.2 G/DL (ref 31.5–35.7)
MCV RBC AUTO: 97 FL (ref 79–97)
MONOCYTES # BLD AUTO: 0.6 X10E3/UL (ref 0.1–0.9)
MONOCYTES NFR BLD AUTO: 8 %
NEUTROPHILS # BLD AUTO: 4.2 X10E3/UL (ref 1.4–7)
NEUTROPHILS NFR BLD AUTO: 61 %
PLATELET # BLD AUTO: 304 X10E3/UL (ref 150–450)
POTASSIUM SERPL-SCNC: 3.8 MMOL/L (ref 3.5–5.2)
PROT SERPL-MCNC: 6.8 G/DL (ref 6–8.5)
RBC # BLD AUTO: 4.07 X10E6/UL (ref 3.77–5.28)
SODIUM SERPL-SCNC: 137 MMOL/L (ref 134–144)
T3 SERPL-MCNC: 126 NG/DL (ref 71–180)
T4 FREE SERPL-MCNC: 1.37 NG/DL (ref 0.82–1.77)
TRIGL SERPL-MCNC: 97 MG/DL (ref 0–149)
TSH SERPL DL<=0.005 MIU/L-ACNC: 1.29 UIU/ML (ref 0.45–4.5)
VLDLC SERPL CALC-MCNC: 18 MG/DL (ref 5–40)
WBC # BLD AUTO: 7 X10E3/UL (ref 3.4–10.8)

## 2025-06-16 RX ORDER — ALENDRONATE SODIUM 70 MG/1
TABLET ORAL
Qty: 12 TABLET | Refills: 3 | Status: SHIPPED | OUTPATIENT
Start: 2025-06-16

## 2025-07-01 ENCOUNTER — APPOINTMENT (OUTPATIENT)
Facility: HOSPITAL | Age: 89
End: 2025-07-01
Payer: MEDICARE

## 2025-07-01 ENCOUNTER — HOSPITAL ENCOUNTER (EMERGENCY)
Facility: HOSPITAL | Age: 89
Discharge: HOME | End: 2025-07-01
Attending: STUDENT IN AN ORGANIZED HEALTH CARE EDUCATION/TRAINING PROGRAM
Payer: MEDICARE

## 2025-07-01 VITALS
TEMPERATURE: 99 F | HEIGHT: 63 IN | RESPIRATION RATE: 18 BRPM | BODY MASS INDEX: 25.66 KG/M2 | DIASTOLIC BLOOD PRESSURE: 69 MMHG | OXYGEN SATURATION: 92 % | HEART RATE: 88 BPM | SYSTOLIC BLOOD PRESSURE: 153 MMHG | WEIGHT: 144.84 LBS
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
## 2025-07-01 DIAGNOSIS — S22.31XA CLOSED FRACTURE OF ONE RIB OF RIGHT SIDE, INITIAL ENCOUNTER: Primary | ICD-10-CM

## 2025-07-01 DIAGNOSIS — S09.90XA CLOSED HEAD INJURY, INITIAL ENCOUNTER: ICD-10-CM

## 2025-07-01 PROCEDURE — 72125 CT NECK SPINE W/O DYE: CPT

## 2025-07-01 PROCEDURE — 70450 CT HEAD/BRAIN W/O DYE: CPT

## 2025-07-01 PROCEDURE — 6370000000 HC RX 637 (ALT 250 FOR IP): Performed by: STUDENT IN AN ORGANIZED HEALTH CARE EDUCATION/TRAINING PROGRAM

## 2025-07-01 PROCEDURE — 99284 EMERGENCY DEPT VISIT MOD MDM: CPT

## 2025-07-01 PROCEDURE — 93005 ELECTROCARDIOGRAM TRACING: CPT | Performed by: STUDENT IN AN ORGANIZED HEALTH CARE EDUCATION/TRAINING PROGRAM

## 2025-07-01 PROCEDURE — 71250 CT THORAX DX C-: CPT

## 2025-07-01 RX ORDER — ACETAMINOPHEN 500 MG
1000 TABLET ORAL ONCE
Status: COMPLETED | OUTPATIENT
Start: 2025-07-01 | End: 2025-07-01

## 2025-07-01 RX ORDER — LIDOCAINE 4 G/G
1 PATCH TOPICAL
Status: DISCONTINUED | OUTPATIENT
Start: 2025-07-01 | End: 2025-07-01 | Stop reason: HOSPADM

## 2025-07-01 RX ORDER — METHOCARBAMOL 500 MG/1
1000 TABLET, FILM COATED ORAL
Status: COMPLETED | OUTPATIENT
Start: 2025-07-01 | End: 2025-07-01

## 2025-07-01 RX ADMIN — METHOCARBAMOL TABLETS 1000 MG: 500 TABLET, COATED ORAL at 13:55

## 2025-07-01 RX ADMIN — ACETAMINOPHEN 1000 MG: 500 TABLET ORAL at 13:55

## 2025-07-01 ASSESSMENT — LIFESTYLE VARIABLES
HOW MANY STANDARD DRINKS CONTAINING ALCOHOL DO YOU HAVE ON A TYPICAL DAY: PATIENT DOES NOT DRINK
HOW OFTEN DO YOU HAVE A DRINK CONTAINING ALCOHOL: NEVER

## 2025-07-01 ASSESSMENT — PAIN DESCRIPTION - ORIENTATION: ORIENTATION: RIGHT

## 2025-07-01 ASSESSMENT — PAIN - FUNCTIONAL ASSESSMENT: PAIN_FUNCTIONAL_ASSESSMENT: 0-10

## 2025-07-01 ASSESSMENT — PAIN SCALES - GENERAL: PAINLEVEL_OUTOF10: 5

## 2025-07-01 ASSESSMENT — PAIN DESCRIPTION - LOCATION: LOCATION: SHOULDER

## 2025-07-01 ASSESSMENT — PAIN DESCRIPTION - DESCRIPTORS: DESCRIPTORS: ACHING

## 2025-07-01 NOTE — ED PROVIDER NOTES
[facility] EMERGENCY DEPARTMENT  EMERGENCY DEPARTMENT ENCOUNTER      Pt Name: Inez Dangelo  MRN: [id number]  Birthdate 4/26/1934  Date of evaluation: 7/1/2025  Provider: Nadia Bennett DO    CHIEF COMPLAINT       Chief Complaint   Patient presents with    Fall    Shoulder Pain           Neck Pain       PMH   Past Medical History:   Diagnosis Date    Allergic rhinitis     Arthritis     Asthma     Basal cell carcinoma (BCC)     Cancer (HCC)     squamous cell skin cancer    Chest pain, unspecified     Coagulation defects     bleeding as child due to lack of vitamin K - no problems now    GERD (gastroesophageal reflux disease)     Hearing loss     wears hearing aides    History of bone density study 01/26/2009    HTN (hypertension), benign     Hyperthyroidism     Hypothyroidism 7/4/23    Mixed hyperlipidemia     Neuropathy not Diagnosed    Nonspecific abnormal unspecified cardiovascular function study 12/20/2010    Osteoarthritis gradual    Urinary incontinence     2or3 years ago         MDM:   Vitals:    Vitals:    07/01/25 1500   BP: (!) 153/69   Pulse:    Resp:    Temp:    SpO2: 92%           This is a 89 y.o. female with pmhx arthritis, asthma, HTN, HLD who presents today for cc of fall . Patient presents with son at bedside who assists with history as patient is poor historian. Reportedly had a mechanical slip and fall in the garden, she apparently loves to garden and does her own weeding/pruning and was crossing a relatively uneven portion where the ground is not perfectly flat and tripped, falling to the ground. Landed awkwardly with her right arm bend under her and between her chest. Afterwards she had pain in the area. Rates the pain 5/10 on the pain scale, radiating across the ribs on the right, worse with deep breathing/cough. Nursing triage note mentions \"back pain\" but patient denies any new back pain complaints to me, simply concerned for the right sided rib pains  She did hit her head at the

## 2025-07-01 NOTE — DISCHARGE INSTRUCTIONS
Use the incentive spirometer 5x per hour (not in a row) while awake for 7-10 days to help prevent pneumonia. You can use lidocaine patch daily and tylenol as needed for pain.

## 2025-07-01 NOTE — ED TRIAGE NOTES
Patient ambulatory to triage with c/o right shoulder pain, mid-chest pain radiating bilaterally, and neck pain, following GLF on Friday.     Further reports lower back pain.     States symptoms began on Friday, and became worse over the weekend.    States was in garden pulling grass and went to sit down on seat which fell backwards. Reports hitting head.    Denies LOC, denies use of blood thinning medication.            4 = No assist / stand by assistance

## 2025-07-02 ENCOUNTER — TELEPHONE (OUTPATIENT)
Facility: CLINIC | Age: 89
End: 2025-07-02

## 2025-07-02 NOTE — TELEPHONE ENCOUNTER
Patient states that she is taking acetaminophen and using lidocaine patches. She states they help with the pain, but once the meds wear off she has a lot of pain. Patient does not worry about falls.

## 2025-07-02 NOTE — TELEPHONE ENCOUNTER
Patient called in to inform NP PRISCILA that she went to the emergency room yesterday and was told to call and let her PCP know. She said they gave her pain medication to take twice a day and it has been making her feel better. I asked the patient if she would like to schedule an appointment and she said she didn't know if that was necessary and wanted to ask for the providers advice. Should I schedule the patient for a hospital follow up?

## 2025-07-03 LAB
EKG ATRIAL RATE: 86 BPM
EKG DIAGNOSIS: NORMAL
EKG P AXIS: 47 DEGREES
EKG P-R INTERVAL: 154 MS
EKG Q-T INTERVAL: 356 MS
EKG QRS DURATION: 74 MS
EKG QTC CALCULATION (BAZETT): 426 MS
EKG R AXIS: -33 DEGREES
EKG T AXIS: 39 DEGREES
EKG VENTRICULAR RATE: 86 BPM

## 2025-07-25 ENCOUNTER — OFFICE VISIT (OUTPATIENT)
Age: 89
End: 2025-07-25
Payer: MEDICARE

## 2025-07-25 VITALS
HEIGHT: 64 IN | SYSTOLIC BLOOD PRESSURE: 120 MMHG | WEIGHT: 142 LBS | BODY MASS INDEX: 24.24 KG/M2 | DIASTOLIC BLOOD PRESSURE: 60 MMHG | OXYGEN SATURATION: 95 % | HEART RATE: 67 BPM

## 2025-07-25 DIAGNOSIS — E78.2 MIXED HYPERLIPIDEMIA: Primary | ICD-10-CM

## 2025-07-25 DIAGNOSIS — I10 PRIMARY HYPERTENSION: ICD-10-CM

## 2025-07-25 DIAGNOSIS — J45.909 ASTHMA, UNSPECIFIED ASTHMA SEVERITY, UNSPECIFIED WHETHER COMPLICATED, UNSPECIFIED WHETHER PERSISTENT: ICD-10-CM

## 2025-07-25 PROCEDURE — G8427 DOCREV CUR MEDS BY ELIG CLIN: HCPCS

## 2025-07-25 PROCEDURE — 99214 OFFICE O/P EST MOD 30 MIN: CPT

## 2025-07-25 PROCEDURE — 1159F MED LIST DOCD IN RCRD: CPT

## 2025-07-25 PROCEDURE — 1123F ACP DISCUSS/DSCN MKR DOCD: CPT

## 2025-07-25 PROCEDURE — G8420 CALC BMI NORM PARAMETERS: HCPCS

## 2025-07-25 PROCEDURE — 1126F AMNT PAIN NOTED NONE PRSNT: CPT

## 2025-07-25 PROCEDURE — 1090F PRES/ABSN URINE INCON ASSESS: CPT

## 2025-07-25 PROCEDURE — 1036F TOBACCO NON-USER: CPT

## 2025-07-25 ASSESSMENT — PATIENT HEALTH QUESTIONNAIRE - PHQ9
2. FEELING DOWN, DEPRESSED OR HOPELESS: NOT AT ALL
1. LITTLE INTEREST OR PLEASURE IN DOING THINGS: NOT AT ALL
SUM OF ALL RESPONSES TO PHQ QUESTIONS 1-9: 0

## 2025-07-25 NOTE — PROGRESS NOTES
1. Have you been to the ER, urgent care clinic since your last visit?  Hospitalized since your last visit?No    2. Have you seen or consulted any other health care providers outside of the [facility] since your last visit?  Include any pap smears or colon screening. No  
sometimes but this responds well to inhaler. She did have a fall in the garden at the beginning of the month, but reports she is feeling much better. She is working with PT for balance. She has had some ongoing family stressors but reports this has improved.       Denies chest pain, edema, syncope, shortness of breath at rest, dyspnea on exertion, PND or orthopnea.  Has no tachycardia, palpitations or sense of arrhythmia.     The ASCVD Risk score (Madison DK, et al., 2019) failed to calculate for the following reasons:    The 2019 ASCVD risk score is only valid for ages 40 to 79     PAST MEDICAL HISTORY:     Past Medical History:   Diagnosis Date    Allergic rhinitis     Arthritis     Asthma     Basal cell carcinoma (BCC)     Cancer (HCC)     squamous cell skin cancer    Chest pain, unspecified     Coagulation defects     bleeding as child due to lack of vitamin K - no problems now    GERD (gastroesophageal reflux disease)     Hearing loss     wears hearing aides    History of bone density study 01/26/2009    HTN (hypertension), benign     Hyperthyroidism     Hypothyroidism 7/4/23    Mixed hyperlipidemia     Neuropathy not Diagnosed    Nonspecific abnormal unspecified cardiovascular function study 12/20/2010    Osteoarthritis gradual    Urinary incontinence     2or3 years ago        Past Surgical History:   Procedure Laterality Date    CATARACT REMOVAL  2011    COLONOSCOPY  03/2012    GYN      D & C    HEENT  12/07/2017    mouth graft    MOHS SURGERY  02/2024    ORTHOPEDIC SURGERY  2010    bunionectomy right foot    ORTHOPEDIC SURGERY  1/2012    bunionectomy left foot    OTHER SURGICAL HISTORY      squamous cell skin cancer removed x 2    TOTAL KNEE ARTHROPLASTY Right 04/27/2017         TOTAL KNEE ARTHROPLASTY Left 07/06/2016    UPPER GASTROINTESTINAL ENDOSCOPY  06/24/2019    Dilated esophagus and 4/12/2017       CURRENT MEDICATIONS:    .  Current Outpatient Medications   Medication Sig Dispense Refill    alendronate

## 2025-08-12 ENCOUNTER — TELEPHONE (OUTPATIENT)
Age: 89
End: 2025-08-12

## 2025-08-20 DIAGNOSIS — E78.2 MIXED HYPERLIPIDEMIA: ICD-10-CM

## 2025-08-20 RX ORDER — ATORVASTATIN CALCIUM 40 MG/1
40 TABLET, FILM COATED ORAL NIGHTLY
Qty: 90 TABLET | Refills: 2 | OUTPATIENT
Start: 2025-08-20

## 2025-08-20 RX ORDER — ATORVASTATIN CALCIUM 40 MG/1
40 TABLET, FILM COATED ORAL NIGHTLY
Qty: 90 TABLET | Refills: 2 | Status: SHIPPED | OUTPATIENT
Start: 2025-08-20

## (undated) DEVICE — GUIDEWIRE

## (undated) DEVICE — Z CONVERTED USE 2274299 CUFF BLD PRESSURE LNG MED AD 25-35 CM ARM FLEXIPORT DISP

## (undated) DEVICE — WRISTBAND ID AD W1XL11.5IN RED POLY ALRG PREPRINTED PERM

## (undated) DEVICE — NEEDLE HYPO 18GA L1.5IN PNK S STL HUB POLYPR SHLD REG BVL

## (undated) DEVICE — KENDALL RADIOLUCENT FOAM MONITORING ELECTRODE -RECTANGULAR SHAPE: Brand: KENDALL

## (undated) DEVICE — ENDO CARRY-ON PROCEDURE KIT INCLUDES ENZYMATIC SPONGE, GAUZE, BIOHAZARD LABEL, TRAY, LUBRICANT, DIRTY SCOPE LABEL, WATER LABEL, TRAY, DRAWSTRING PAD, AND DEFENDO 4-PIECE KIT.: Brand: ENDO CARRY-ON PROCEDURE KIT

## (undated) DEVICE — 1200 GUARD II KIT W/5MM TUBE W/O VAC TUBE: Brand: GUARDIAN

## (undated) DEVICE — FORCEPS BX L240CM JAW DIA2.8MM L CAP W/ NDL MIC MESH TOOTH

## (undated) DEVICE — KIT IV STRT W CHLORAPREP PD 1ML

## (undated) DEVICE — Device

## (undated) DEVICE — TUBING HYDR IRR --

## (undated) DEVICE — CANN NASAL O2 CAPNOGRAPHY AD -- FILTERLINE

## (undated) DEVICE — SET ADMIN 16ML TBNG L100IN 2 Y INJ SITE IV PIGGY BK DISP

## (undated) DEVICE — WORKING LENGTH 235CM, WORKING CHANNEL 2.8MM: Brand: RESOLUTION 360 CLIP

## (undated) DEVICE — SET EXTN TBNG L BOR 4 W STPCOCK ST 32IN PRIMING VOL 6ML

## (undated) DEVICE — SOLIDIFIER FLUID 3000 CC ABSORB

## (undated) DEVICE — BITE BLK ENDOSCP AD 54FR GRN POLYETH ENDOSCP W STRP SLD

## (undated) DEVICE — ESOPHAGEAL BALLOON DILATATION CATHETER: Brand: CRE FIXED WIRE

## (undated) DEVICE — SYRINGE MED 20ML STD CLR PLAS LUERLOCK TIP N CTRL DISP

## (undated) DEVICE — BAG SPEC BIOHZD LF 2MIL 6X10IN -- CONVERT TO ITEM 357326

## (undated) DEVICE — BAG BELONG PT PERS CLEAR HANDL

## (undated) DEVICE — CONTAINER SPEC 20 ML LID NEUT BUFF FORMALIN 10 % POLYPR STS

## (undated) DEVICE — BW-412T DISP COMBO CLEANING BRUSH: Brand: SINGLE USE COMBINATION CLEANING BRUSH

## (undated) DEVICE — Z DISCONTINUED NO SUB IDED SET EXTN W/ 4 W STPCOCK M SPIN LOK 36IN

## (undated) DEVICE — CATH IV AUTOGRD BC BLU 22GA 25 -- INSYTE

## (undated) DEVICE — Device: Brand: MEDICAL ACTION INDUSTRIES